# Patient Record
Sex: MALE | Race: WHITE | Employment: FULL TIME | URBAN - METROPOLITAN AREA
[De-identification: names, ages, dates, MRNs, and addresses within clinical notes are randomized per-mention and may not be internally consistent; named-entity substitution may affect disease eponyms.]

---

## 2017-06-16 PROBLEM — I25.118 CORONARY ARTERY DISEASE OF NATIVE ARTERY OF NATIVE HEART WITH STABLE ANGINA PECTORIS (HCC): Status: ACTIVE | Noted: 2017-06-16

## 2017-06-22 NOTE — PROGRESS NOTES
Patient pre-assessment complete for [facility] poss with [facility] scheduled for 17 at 12:30pm, arrival time 10:30am. Patient verified using . Patient instructed to bring all home medications in labeled bottles on the day of procedure. NPO status reinforced. Patient informed to take a full dose aspirin 325mg  or 81 mg x 4 on the day of procedure. Patient instructed to HOLD trulicity, invokana, glipizide in am. Instructed they can take all other medications excluding vitamins & supplements. Patient verbalizes understanding of all instructions & denies any questions at this time.

## 2017-06-23 ENCOUNTER — HOSPITAL ENCOUNTER (OUTPATIENT)
Dept: CARDIAC CATH/INVASIVE PROCEDURES | Age: 57
Discharge: HOME | End: 2017-06-23
Attending: INTERNAL MEDICINE | Admitting: INTERNAL MEDICINE
Payer: COMMERCIAL

## 2017-06-23 VITALS
BODY MASS INDEX: 33.13 KG/M2 | WEIGHT: 250 LBS | DIASTOLIC BLOOD PRESSURE: 73 MMHG | OXYGEN SATURATION: 95 % | HEIGHT: 73 IN | HEART RATE: 76 BPM | RESPIRATION RATE: 16 BRPM | SYSTOLIC BLOOD PRESSURE: 148 MMHG | TEMPERATURE: 98 F

## 2017-06-23 LAB
ANION GAP BLD CALC-SCNC: 9 MMOL/L (ref 7–16)
ATRIAL RATE: 74 BPM
BUN SERPL-MCNC: 18 MG/DL (ref 6–23)
CALCIUM SERPL-MCNC: 9 MG/DL (ref 8.3–10.4)
CALCULATED P AXIS, ECG09: 25 DEGREES
CALCULATED R AXIS, ECG10: 27 DEGREES
CALCULATED T AXIS, ECG11: 67 DEGREES
CHLORIDE SERPL-SCNC: 107 MMOL/L (ref 98–107)
CO2 SERPL-SCNC: 25 MMOL/L (ref 21–32)
CREAT SERPL-MCNC: 0.76 MG/DL (ref 0.8–1.5)
DIAGNOSIS, 93000: NORMAL
ERYTHROCYTE [DISTWIDTH] IN BLOOD BY AUTOMATED COUNT: 14.1 % (ref 11.9–14.6)
GLUCOSE SERPL-MCNC: 159 MG/DL (ref 65–100)
HCT VFR BLD AUTO: 49.4 % (ref 41.1–50.3)
HGB BLD-MCNC: 16.5 G/DL (ref 13.6–17.2)
INR PPP: 1 (ref 0.9–1.2)
MCH RBC QN AUTO: 27.9 PG (ref 26.1–32.9)
MCHC RBC AUTO-ENTMCNC: 33.4 G/DL (ref 31.4–35)
MCV RBC AUTO: 83.4 FL (ref 79.6–97.8)
P-R INTERVAL, ECG05: 178 MS
PLATELET # BLD AUTO: 165 K/UL (ref 150–450)
PMV BLD AUTO: 9.9 FL (ref 10.8–14.1)
POTASSIUM SERPL-SCNC: 4.4 MMOL/L (ref 3.5–5.1)
PROTHROMBIN TIME: 10.7 SEC (ref 9.6–12)
Q-T INTERVAL, ECG07: 374 MS
QRS DURATION, ECG06: 110 MS
QTC CALCULATION (BEZET), ECG08: 415 MS
RBC # BLD AUTO: 5.92 M/UL (ref 4.23–5.67)
SODIUM SERPL-SCNC: 141 MMOL/L (ref 136–145)
VENTRICULAR RATE, ECG03: 74 BPM
WBC # BLD AUTO: 5.3 K/UL (ref 4.3–11.1)

## 2017-06-23 PROCEDURE — 74011250637 HC RX REV CODE- 250/637: Performed by: INTERNAL MEDICINE

## 2017-06-23 PROCEDURE — 74011000250 HC RX REV CODE- 250: Performed by: INTERNAL MEDICINE

## 2017-06-23 PROCEDURE — 74011636320 HC RX REV CODE- 636/320: Performed by: INTERNAL MEDICINE

## 2017-06-23 PROCEDURE — 93458 L HRT ARTERY/VENTRICLE ANGIO: CPT

## 2017-06-23 PROCEDURE — 99152 MOD SED SAME PHYS/QHP 5/>YRS: CPT

## 2017-06-23 PROCEDURE — 93005 ELECTROCARDIOGRAM TRACING: CPT | Performed by: INTERNAL MEDICINE

## 2017-06-23 PROCEDURE — C1894 INTRO/SHEATH, NON-LASER: HCPCS

## 2017-06-23 PROCEDURE — 85610 PROTHROMBIN TIME: CPT | Performed by: INTERNAL MEDICINE

## 2017-06-23 PROCEDURE — C1760 CLOSURE DEV, VASC: HCPCS

## 2017-06-23 PROCEDURE — 99153 MOD SED SAME PHYS/QHP EA: CPT

## 2017-06-23 PROCEDURE — 85027 COMPLETE CBC AUTOMATED: CPT | Performed by: INTERNAL MEDICINE

## 2017-06-23 PROCEDURE — 74011250636 HC RX REV CODE- 250/636

## 2017-06-23 PROCEDURE — 77030004558 HC CATH ANGI DX SUPR TORQ CARD -A

## 2017-06-23 PROCEDURE — C1769 GUIDE WIRE: HCPCS

## 2017-06-23 PROCEDURE — 74011250636 HC RX REV CODE- 250/636: Performed by: INTERNAL MEDICINE

## 2017-06-23 PROCEDURE — 77030004559 HC CATH ANGI DX SUPT CARD -B

## 2017-06-23 PROCEDURE — 80048 BASIC METABOLIC PNL TOTAL CA: CPT | Performed by: INTERNAL MEDICINE

## 2017-06-23 RX ORDER — MIDAZOLAM HYDROCHLORIDE 1 MG/ML
.5-2 INJECTION, SOLUTION INTRAMUSCULAR; INTRAVENOUS
Status: DISCONTINUED | OUTPATIENT
Start: 2017-06-23 | End: 2017-06-23

## 2017-06-23 RX ORDER — SODIUM CHLORIDE 9 MG/ML
75 INJECTION, SOLUTION INTRAVENOUS CONTINUOUS
Status: DISCONTINUED | OUTPATIENT
Start: 2017-06-23 | End: 2017-06-23 | Stop reason: HOSPADM

## 2017-06-23 RX ORDER — CLOPIDOGREL BISULFATE 75 MG/1
75 TABLET ORAL DAILY
Qty: 30 TAB | Refills: 11 | Status: SHIPPED | OUTPATIENT
Start: 2017-06-23 | End: 2017-08-03 | Stop reason: ALTCHOICE

## 2017-06-23 RX ORDER — HEPARIN SODIUM 200 [USP'U]/100ML
3 INJECTION, SOLUTION INTRAVENOUS CONTINUOUS
Status: DISCONTINUED | OUTPATIENT
Start: 2017-06-23 | End: 2017-06-23

## 2017-06-23 RX ORDER — MORPHINE SULFATE 2 MG/ML
2 INJECTION, SOLUTION INTRAMUSCULAR; INTRAVENOUS ONCE
Status: COMPLETED | OUTPATIENT
Start: 2017-06-23 | End: 2017-06-23

## 2017-06-23 RX ORDER — DIAZEPAM 5 MG/1
5 TABLET ORAL ONCE
Status: COMPLETED | OUTPATIENT
Start: 2017-06-23 | End: 2017-06-23

## 2017-06-23 RX ORDER — GUAIFENESIN 100 MG/5ML
81-324 LIQUID (ML) ORAL ONCE
Status: DISCONTINUED | OUTPATIENT
Start: 2017-06-23 | End: 2017-06-23 | Stop reason: HOSPADM

## 2017-06-23 RX ORDER — LIDOCAINE HYDROCHLORIDE 20 MG/ML
60-200 INJECTION, SOLUTION INFILTRATION; PERINEURAL
Status: DISCONTINUED | OUTPATIENT
Start: 2017-06-23 | End: 2017-06-23

## 2017-06-23 RX ADMIN — MORPHINE SULFATE 2 MG: 2 INJECTION, SOLUTION INTRAMUSCULAR; INTRAVENOUS at 15:40

## 2017-06-23 RX ADMIN — LIDOCAINE HYDROCHLORIDE 200 MG: 20 INJECTION, SOLUTION INFILTRATION; PERINEURAL at 12:40

## 2017-06-23 RX ADMIN — DIAZEPAM 5 MG: 5 TABLET ORAL at 10:59

## 2017-06-23 RX ADMIN — SODIUM CHLORIDE 75 ML/HR: 900 INJECTION, SOLUTION INTRAVENOUS at 10:56

## 2017-06-23 RX ADMIN — IOPAMIDOL 225 ML: 755 INJECTION, SOLUTION INTRAVENOUS at 13:25

## 2017-06-23 RX ADMIN — MIDAZOLAM HYDROCHLORIDE 2 MG: 1 INJECTION, SOLUTION INTRAMUSCULAR; INTRAVENOUS at 12:54

## 2017-06-23 RX ADMIN — MIDAZOLAM HYDROCHLORIDE 2 MG: 1 INJECTION, SOLUTION INTRAMUSCULAR; INTRAVENOUS at 12:39

## 2017-06-23 RX ADMIN — HEPARIN SODIUM 3 ML/HR: 200 INJECTION, SOLUTION INTRAVENOUS at 12:40

## 2017-06-23 RX ADMIN — LIDOCAINE HYDROCHLORIDE 200 MG: 20 INJECTION, SOLUTION INFILTRATION; PERINEURAL at 12:45

## 2017-06-23 RX ADMIN — MIDAZOLAM HYDROCHLORIDE 2 MG: 1 INJECTION, SOLUTION INTRAMUSCULAR; INTRAVENOUS at 12:45

## 2017-06-23 NOTE — PROCEDURES
Brief Cardiac Procedure Note    Patient: Marcelo Wallace MRN: [id number]  SSN: [id number]    YOB: 1960  Age: 64 y.o. Sex: male      Date of Procedure: 6/23/2017     Pre-procedure Diagnosis: Coronary Artery Disease    Post-procedure Diagnosis: Coronary Artery Disease    Procedure: Left Heart Catheterization    Brief Description of Procedure: via rfa and lfa    Performed By: Mouna Martínez MD     Assistants:     Anesthesia: Moderate Sedation    Estimated Blood Loss: Less than 10 mL      Specimens: None    Implants: None    Findings:   lv ok  Lm ok  Lad 100%, fills late  lcx ok  rca ok  + Mynx x 2    Complications: None    Recommendations: pci 7/19.     Signed By: Mouna Martínez MD     June 23, 2017

## 2017-06-23 NOTE — PROGRESS NOTES
TRANSFER - OUT REPORT:    Verbal report given to Kat Gibbons RN (name) on Dilia Barnes  being transferred to CPRU (unit) for routine progression of care       Report consisted of patients Situation, Background, Assessment and   Recommendations(SBAR). Information from the following report(s) SBAR was reviewed with the receiving nurse. Lines:   Peripheral IV 06/23/17 Right Antecubital (Active)       Peripheral IV 06/23/17 Left Forearm (Active)        Opportunity for questions and clarification was provided. Patient transported with:   Tech         Pt had LHC via L and R groin. Both sites closed with Mynx and tegaderm. Pt received Versed 6mg IV.

## 2017-06-23 NOTE — PROGRESS NOTES
Patient received to [address] [place] [address] room # 4  Ambulatory from [facility]. Patient scheduled for [facility] today with Dr Luz Smith. Procedure reviewed & questions answered, voiced good understanding consent obtained & placed on chart. All medications and medical history reviewed. Will prep patient per orders. Patient & family updated on plan of care.

## 2017-06-23 NOTE — IP AVS SNAPSHOT
[facility] 
 
 
 [address] [facility] [address] [phone] Patient: Shelby Hodges MRN: [id number] THU:4/9/3677 Discharge Summary 6/23/2017 Shelby COXN[de-identified]  [id number] Admission Information Provider Pager Service Admission Date Expected D/C Date Je Jimenez MD  CARDIAC CATH LAB 6/23/2017 6/23/2017 Actual LOS Patient Class 0 days OUTPATIENT Follow-up Information Follow up With Details Comments Contact Info Meaghan Castillo MD   [id number] [place] [address] 
[phone] Procedure scheduled for Wednesday July 19th at 8am, please arrive to [facility] at [place]   
  
  
Current Discharge Medication List  
  
START taking these medications Dose & Instructions Dispensing Information Comments Morning Noon Evening Bedtime  
 clopidogrel 75 mg Tab Commonly known as:  PLAVIX Your last dose was: Your next dose is:    
   
   
 Dose:  75 mg Take 1 Tab by mouth daily. Quantity:  30 Tab Refills:  11 CONTINUE these medications which have CHANGED Dose & Instructions Dispensing Information Comments Morning Noon Evening Bedtime  
 clotrimazole-betamethasone topical cream  
Commonly known as:  Tabatha Franci What changed:   
- when to take this 
- reasons to take this 
- additional instructions Your last dose was: Your next dose is:    
   
   
 1 application 2 times daily Quantity:  30 g Refills:  4  
     
   
   
   
  
 omega-3 acid ethyl esters 1 gram capsule Commonly known as:  Marcelo Tristan What changed:  when to take this Your last dose was: Your next dose is:    
   
   
 Dose:  4 Cap Take 4 Caps by mouth four (4) times daily. Quantity:  360 Cap Refills:  3 CONTINUE these medications which have NOT CHANGED Dose & Instructions Dispensing Information Comments Morning Noon Evening Bedtime  
 amLODIPine 10 mg tablet Commonly known as:  Wing Ramos Your last dose was: Your next dose is:    
   
   
 Dose:  10 mg Take 1 Tab by mouth daily. Indications: Hypertension Quantity:  90 Tab Refills:  3  
     
   
   
   
  
 aspirin delayed-release 325 mg tablet Your last dose was: Your next dose is:    
   
   
 Dose:  325 mg Take 325 mg by mouth daily. Refills:  0  
     
   
   
   
  
 benazepril 40 mg tablet Commonly known as:  LOTENSIN Your last dose was: Your next dose is:    
   
   
 Dose:  40 mg Take 1 Tab by mouth daily. Indications: Hypertension Quantity:  90 Tab Refills:  3  
     
   
   
   
  
 canagliflozin 300 mg tablet Commonly known as:  Ruiz Beverage Your last dose was: Your next dose is:    
   
   
 Dose:  300 mg Take 1 Tab by mouth daily. Quantity:  90 Tab Refills:  3  
     
   
   
   
  
 * VITAMIN B-12 1,000 mcg tablet Generic drug:  cyanocobalamin Your last dose was: Your next dose is:    
   
   
 Dose:  1000 mcg Take 1,000 mcg by mouth daily. Refills:  0  
     
   
   
   
  
 * cyanocobalamin (vitamin B-12) 1,000 mcg/mL Kit Your last dose was: Your next dose is:    
   
   
 Dose:  1000 mcg  
1,000 mcg by Injection route every month. Quantity:  3 Vial  
Refills:  3  
     
   
   
   
  
 dicyclomine 10 mg capsule Commonly known as:  BENTYL Your last dose was: Your next dose is:    
   
   
 Dose:  10 mg  
take 10 mg by mouth four (4) times daily. Refills:  0  
     
   
   
   
  
 dulaglutide 1.5 mg/0.5 mL sub-q pen Commonly known as:  TRULICITY Your last dose was: Your next dose is:    
   
   
 Dose:  1.5 mg  
0.5 mL by SubCUTAneous route every seven (7) days. Quantity:  12 Pen Refills:  3 fenofibrate 160 mg tablet Commonly known as:  LOFIBRA Your last dose was: Your next dose is:    
   
   
 Dose:  160 mg Take 1 Tab by mouth daily. Quantity:  90 Tab Refills:  3  
     
   
   
   
  
 folic acid 1 mg tablet Commonly known as:  Google Your last dose was: Your next dose is:    
   
   
 Dose:  1 mg Take 1 Tab by mouth daily. Quantity:  90 Tab Refills:  3  
     
   
   
   
  
 glipiZIDE 10 mg tablet Commonly known as:  Shreya Bruce Your last dose was: Your next dose is:    
   
   
 Dose:  10 mg Take 1 Tab by mouth two (2) times a day. Quantity:  180 Tab Refills:  3 HYDROcodone-acetaminophen 7.5-325 mg per tablet Commonly known as:  Rebecca Corral Your last dose was: Your next dose is:    
   
   
 Dose:  1 Tab Take 1 Tab by mouth four (4) times daily as needed. Refills:  0  
     
   
   
   
  
 isosorbide mononitrate 10 mg tablet Commonly known as:  ISMO, MONOKET Your last dose was: Your next dose is:    
   
   
 Dose:  10 mg Take 1 Tab by mouth two (2) times a day. Quantity:  180 Tab Refills:  3  
     
   
   
   
  
 lansoprazole 15 mg capsule Commonly known as:  PREVACID Your last dose was: Your next dose is: Take  by mouth Daily (before breakfast). Refills:  0  
     
   
   
   
  
 metFORMIN 1,000 mg tablet Commonly known as:  GLUCOPHAGE Your last dose was: Your next dose is:    
   
   
 Dose:  1000 mg Take 1 Tab by mouth two (2) times daily (with meals). Quantity:  180 Tab Refills:  3 Use generic   
    
   
   
   
  
 metoprolol tartrate 100 mg IR tablet Commonly known as:  LOPRESSOR Your last dose was: Your next dose is:    
   
   
 Dose:  100 mg Take 1 Tab by mouth two (2) times a day. Quantity:  180 Tab Refills:  3  
     
   
   
   
  
 multivitamin tablet Commonly known as:  ONE A DAY Your last dose was: Your next dose is:    
   
   
 Dose:  1 Tab Take 1 Tab by mouth daily. Refills:  0  
     
   
   
   
  
 nitroglycerin 0.4 mg SL tablet Commonly known as:  NITROSTAT Your last dose was: Your next dose is:    
   
   
 Dose:  0.4 mg  
1 Tab by SubLINGual route every five (5) minutes as needed for Chest Pain. Quantity:  3 Bottle Refills:  3  
     
   
   
   
  
 ondansetron hcl 4 mg tablet Commonly known as:  Jacquie Crowley Your last dose was: Your next dose is:    
   
   
 Dose:  4 mg Take 4 mg by mouth. 1 tablet every 4-6 hours prn nausea and vomiting Refills:  0  
     
   
   
   
  
 potassium chloride 10 mEq tablet Commonly known as:  KLOR-CON Your last dose was: Your next dose is:    
   
   
 Dose:  10 mEq Take 1 Tab by mouth daily. Quantity:  90 Tab Refills:  3  
     
   
   
   
  
 pravastatin 40 mg tablet Commonly known as:  PRAVACHOL Your last dose was: Your next dose is:    
   
   
 Dose:  40 mg Take 1 Tab by mouth daily. Quantity:  90 Tab Refills:  3  
     
   
   
   
  
 ranolazine  mg SR tablet Commonly known as:  RANEXA Your last dose was: Your next dose is:    
   
   
 Dose:  500 mg Take 1 Tab by mouth two (2) times a day. Quantity:  180 Tab Refills:  3  
     
   
   
   
  
 traMADol 50 mg tablet Commonly known as:  ULTRAM  
   
Your last dose was: Your next dose is:    
   
   
 Dose:  50 mg Take 50 mg by mouth every six (6) hours as needed for Pain. Refills:  0  
     
   
   
   
  
 * Notice: This list has 2 medication(s) that are the same as other medications prescribed for you. Read the directions carefully, and ask your doctor or other care provider to review them with you. Where to Get Your Medications These medications were sent to [address], [address] [address] Phone:  [phone]  
  clopidogrel 75 mg Tab General Information Please provide this summary of care documentation to your next provider. Allergies Unspecified:  Meloxicam  
  
Current Immunizations  Never Reviewed Name Date Influenza Vaccine 9/11/2015 Influenza Vaccine (Quad) PF 9/28/2016 Pneumococcal Vaccine (Unspecified Type) 10/25/2013 Discharge Instructions Discharge Instructions HEART CATHETERIZATION/ANGIOGRAPHY DISCHARGE INSTRUCTIONS 1. Check puncture site frequently for swelling or bleeding. If there is any bleeding, lie down and apply pressure over the area with a clean towel or washcloth and call 911. Notify your doctor for any redness, swelling, drainage, or oozing from the puncture site. Notify your doctor for any fever or chills. 2. If the extremity becomes cold, numb, or painful call [facility] Cardiology at [phone]. 
3. Activity should be limited for the next 48 hours. Climb stairs as little as possible and avoid any stooping, bending, or strenuous activity for 48 hours. No heavy lifting (anything over 10 pounds) for 3 days. 4. You may resume your usual diet. Drink more fluids than usual. 
5. Have a responsible person drive you home and stay with you for at least 24 hours after your heart catheterization/angiography. 6. You may remove bandage from your Right, left groin in 24 hours. You may shower in 24 hours. No tub baths, hot tubs, or swimming for 1 week. Do not place any lotions, creams, powders, or ointments over puncture site for 1 week. You may place a clean band-aid over the puncture site each day for 5 days. Change daily. Procedure scheduled for Wednesday July 19th at 8am, please arrive to [address] [address] at [address] ([address]) UNTIL Sunday AFTERNOON. I have read the above instructions and have had the opportunity to ask questions. Patient: ________________________   Date: 6/23/2017 Witness: _______________________   Date: 6/23/2017 Discharge Orders None  
  
` Patient Signature:  ____________________________________________________________ Date:  ____________________________________________________________  
  
 Jayme Mario Alberto Provider Signature:  ____________________________________________________________ Date:  ____________________________________________________________

## 2017-06-23 NOTE — PROGRESS NOTES
Patient up to bedside, vital signs and site stable. Patient ambulated to bathroom without difficulty. Patient voided without difficulty. Vascular site stable. 1647 Discharge instructions and home medications reviewed with patient. Time allowed for questions and answers. 1653 Patient ambulated second time without difficulty. Site stable after ambulation. Peripheral IV sites dc'd without difficulty with tips intact. 1700 Patient discharged to home with family.

## 2017-06-23 NOTE — DISCHARGE INSTRUCTIONS
HEART CATHETERIZATION/ANGIOGRAPHY DISCHARGE INSTRUCTIONS    1. Check puncture site frequently for swelling or bleeding. If there is any bleeding, lie down and apply pressure over the area with a clean towel or washcloth and call 911. Notify your doctor for any redness, swelling, drainage, or oozing from the puncture site. Notify your doctor for any fever or chills. 2. If the extremity becomes cold, numb, or painful call [address] [address] Cardiology at [phone].  3. Activity should be limited for the next 48 hours. Climb stairs as little as possible and avoid any stooping, bending, or strenuous activity for 48 hours. No heavy lifting (anything over 10 pounds) for 3 days. 4. You may resume your usual diet. Drink more fluids than usual.  5. Have a responsible person drive you home and stay with you for at least 24 hours after your heart catheterization/angiography. 6. You may remove bandage from your Right, left groin in 24 hours. You may shower in 24 hours. No tub baths, hot tubs, or swimming for 1 week. Do not place any lotions, creams, powders, or ointments over puncture site for 1 week. You may place a clean band-aid over the puncture site each day for 5 days. Change daily. Procedure scheduled for Wednesday July 19th at 8am, [facility] ([address] UNTIL Sunday AFTERNOON. I have read the above instructions and have had the opportunity to ask questions.       Patient: ________________________   Date: 6/23/2017    Witness: _______________________   Date: 6/23/2017

## 2017-06-23 NOTE — PROCEDURES
[facility] [address]       Name:  Elias Pritchett   MR#:  [id number]   :  1960   Account #:  [de-identified]   Date of Adm:  2017       PROCEDURE PERFORMED: Left heart catheterization, left   ventriculography, and coronary angiography. HISTORY: This is a 63-year-old gentleman with coronary artery   disease. He has a known chronic occlusion of his left anterior   descending coronary artery. He had chronic angina that has been   somewhat progressive. A nuclear stress test shows anterior   ischemia. A cardiac catheterization in anticipation of possible    angioplasty is recommended. PROCEDURE: Left heart catheterization, left ventriculography,   coronary angiography, and simultaneous left and right coronary   angiography were performed via the right and left common femoral   arteries without difficulty with a 6-Bolivian JL4, JR4, and angled   pigtail. At the end of the procedure, the sheaths were removed   and Mynx closure devices were used with good hemostasis. FINDINGS: The central aortic pressure is 120/70 mmHg. The left   ventricular end-diastolic pressure is 10 mmHg. There is no   gradient on pullback across the aortic valve. The overall left ventricular size is normal. The wall motion is   normal. The ejection fraction is 60%. The left main is normal. It divides into an LAD and left   circumflex. The LAD is calcified in its proximal segment. It is occluded   after the first septal . The distal vessel fills by   collateral flow from the right. There is a large major   bifurcated diagonal branch off the LAD, which is chronically   occluded. It fills by antegrade flow. The left circumflex is a big vessel. There is mild proximal   disease. There is a stent in the mid portion with no restenosis. The distal vessel has mild obstruction at a bifurcation. The right coronary artery is a big vessel.  It has diffuse   atherosclerotic irregularity with no significant obstruction. It   provides distal collateral filling to the LAD circulation. IMPRESSION   1. Normal left ventricular function. 2. Coronary artery disease, as described. The proximal left   anterior descending is occluded and fills well by collateral   flow from the right. There is a large major diagonal branch   which is chronically occluded at its origin. The left circumflex   has been stented in its mid portion with no restenosis. The   right coronary artery has mild disease.     RECOMMENDATIONS: The patient will be considered for  PCI of   the LAD and LAD diagonal.        Arelia Skiff, MD [facility] / ALICIA   D:  06/23/2017   13:37   T:  06/23/2017   13:57   Job #:  [id number]

## 2017-06-23 NOTE — PROGRESS NOTES
Report received from Jerry, [address] [place] [address]. Procedural findings communicated. Intra procedural  medication administration reviewed. Progression of care discussed.      Patient received into [address] [facility] 7 post sheath removal.     Access site without bleeding or swelling yes    Dressing dry and intact yes    Patient instructed to limit movement to bilateral lower extremity    Routine post procedural vital signs and site assessment initiated yes

## 2017-07-17 NOTE — PROGRESS NOTES
Patient pre-assessment complete for [facility] scheduled for , arrival time 0600 am. Patient verified using . Patient instructed to bring all home medications in labeled bottles on the day of procedure. NPO status reinforced. Patient informed to take a full dose aspirin 325mg  or 81 mg x 4 on the day of procedure. Patient instructed to HOLD Benazepril, Invokana, Metformin (starting tomorrow), and  Glucotrol morning of procedure. Instructed they can take all other medications excluding vitamins & supplements. Patient verbalizes understanding of all instructions & denies any questions at this time.

## 2017-07-19 ENCOUNTER — HOSPITAL ENCOUNTER (OUTPATIENT)
Dept: CARDIAC CATH/INVASIVE PROCEDURES | Age: 57
Setting detail: OBSERVATION
Discharge: HOME | End: 2017-07-20
Attending: INTERNAL MEDICINE | Admitting: INTERNAL MEDICINE
Payer: COMMERCIAL

## 2017-07-19 LAB
ACT BLD: 219 SECS (ref 70–128)
ACT BLD: 257 SECS (ref 70–128)
ACT BLD: 268 SECS (ref 70–128)
ACT BLD: 285 SECS (ref 70–128)
ACT BLD: 290 SECS (ref 70–128)
ACT BLD: 301 SECS (ref 70–128)
ACT BLD: 307 SECS (ref 70–128)
ANION GAP BLD CALC-SCNC: 10 MMOL/L (ref 7–16)
ATRIAL RATE: 75 BPM
BUN SERPL-MCNC: 17 MG/DL (ref 6–23)
CALCIUM SERPL-MCNC: 9.3 MG/DL (ref 8.3–10.4)
CALCULATED P AXIS, ECG09: 29 DEGREES
CALCULATED R AXIS, ECG10: 24 DEGREES
CALCULATED T AXIS, ECG11: 59 DEGREES
CHLORIDE SERPL-SCNC: 109 MMOL/L (ref 98–107)
CO2 SERPL-SCNC: 22 MMOL/L (ref 21–32)
CREAT SERPL-MCNC: 0.75 MG/DL (ref 0.8–1.5)
DIAGNOSIS, 93000: NORMAL
ERYTHROCYTE [DISTWIDTH] IN BLOOD BY AUTOMATED COUNT: 14.3 % (ref 11.9–14.6)
GLUCOSE BLD STRIP.AUTO-MCNC: 166 MG/DL (ref 65–100)
GLUCOSE SERPL-MCNC: 212 MG/DL (ref 65–100)
HCT VFR BLD AUTO: 47.6 % (ref 41.1–50.3)
HGB BLD-MCNC: 15.4 G/DL (ref 13.6–17.2)
INR PPP: 1 (ref 0.9–1.2)
MCH RBC QN AUTO: 27.5 PG (ref 26.1–32.9)
MCHC RBC AUTO-ENTMCNC: 32.4 G/DL (ref 31.4–35)
MCV RBC AUTO: 85 FL (ref 79.6–97.8)
P-R INTERVAL, ECG05: 184 MS
PLATELET # BLD AUTO: 157 K/UL (ref 150–450)
PMV BLD AUTO: 10.4 FL (ref 10.8–14.1)
POTASSIUM SERPL-SCNC: 4.3 MMOL/L (ref 3.5–5.1)
PROTHROMBIN TIME: 10.4 SEC (ref 9.6–12)
Q-T INTERVAL, ECG07: 386 MS
QRS DURATION, ECG06: 100 MS
QTC CALCULATION (BEZET), ECG08: 431 MS
RBC # BLD AUTO: 5.6 M/UL (ref 4.23–5.67)
SODIUM SERPL-SCNC: 141 MMOL/L (ref 136–145)
VENTRICULAR RATE, ECG03: 75 BPM
WBC # BLD AUTO: 3.9 K/UL (ref 4.3–11.1)

## 2017-07-19 PROCEDURE — C1769 GUIDE WIRE: HCPCS

## 2017-07-19 PROCEDURE — 99218 HC RM OBSERVATION: CPT

## 2017-07-19 PROCEDURE — C1887 CATHETER, GUIDING: HCPCS

## 2017-07-19 PROCEDURE — 85347 COAGULATION TIME ACTIVATED: CPT

## 2017-07-19 PROCEDURE — C1725 CATH, TRANSLUMIN NON-LASER: HCPCS

## 2017-07-19 PROCEDURE — 92943 PRQ TRLUML REVSC CH OCC ANT: CPT

## 2017-07-19 PROCEDURE — C1894 INTRO/SHEATH, NON-LASER: HCPCS

## 2017-07-19 PROCEDURE — 82962 GLUCOSE BLOOD TEST: CPT

## 2017-07-19 PROCEDURE — 74011636320 HC RX REV CODE- 636/320: Performed by: INTERNAL MEDICINE

## 2017-07-19 PROCEDURE — 85610 PROTHROMBIN TIME: CPT | Performed by: INTERNAL MEDICINE

## 2017-07-19 PROCEDURE — 99153 MOD SED SAME PHYS/QHP EA: CPT

## 2017-07-19 PROCEDURE — 80048 BASIC METABOLIC PNL TOTAL CA: CPT | Performed by: INTERNAL MEDICINE

## 2017-07-19 PROCEDURE — C1874 STENT, COATED/COV W/DEL SYS: HCPCS

## 2017-07-19 PROCEDURE — 74011250636 HC RX REV CODE- 250/636

## 2017-07-19 PROCEDURE — 99152 MOD SED SAME PHYS/QHP 5/>YRS: CPT

## 2017-07-19 PROCEDURE — 74011250637 HC RX REV CODE- 250/637: Performed by: INTERNAL MEDICINE

## 2017-07-19 PROCEDURE — 74011250636 HC RX REV CODE- 250/636: Performed by: INTERNAL MEDICINE

## 2017-07-19 PROCEDURE — 85027 COMPLETE CBC AUTOMATED: CPT | Performed by: INTERNAL MEDICINE

## 2017-07-19 PROCEDURE — 74011000250 HC RX REV CODE- 250: Performed by: INTERNAL MEDICINE

## 2017-07-19 PROCEDURE — C1760 CLOSURE DEV, VASC: HCPCS

## 2017-07-19 PROCEDURE — 93005 ELECTROCARDIOGRAM TRACING: CPT | Performed by: INTERNAL MEDICINE

## 2017-07-19 PROCEDURE — 77030012468 HC VLV BLEEDBK CNTRL ABBT -B

## 2017-07-19 RX ORDER — HYDROMORPHONE HYDROCHLORIDE 1 MG/ML
1 INJECTION, SOLUTION INTRAMUSCULAR; INTRAVENOUS; SUBCUTANEOUS
Status: COMPLETED | OUTPATIENT
Start: 2017-07-19 | End: 2017-07-19

## 2017-07-19 RX ORDER — PRAVASTATIN SODIUM 20 MG/1
40 TABLET ORAL DAILY
Status: DISCONTINUED | OUTPATIENT
Start: 2017-07-20 | End: 2017-07-20 | Stop reason: HOSPADM

## 2017-07-19 RX ORDER — MIDAZOLAM HYDROCHLORIDE 1 MG/ML
.5-5 INJECTION, SOLUTION INTRAMUSCULAR; INTRAVENOUS
Status: DISCONTINUED | OUTPATIENT
Start: 2017-07-19 | End: 2017-07-19 | Stop reason: HOSPADM

## 2017-07-19 RX ORDER — HEPARIN SODIUM 200 [USP'U]/100ML
3 INJECTION, SOLUTION INTRAVENOUS CONTINUOUS
Status: DISCONTINUED | OUTPATIENT
Start: 2017-07-19 | End: 2017-07-19 | Stop reason: HOSPADM

## 2017-07-19 RX ORDER — GUAIFENESIN 100 MG/5ML
81-324 LIQUID (ML) ORAL ONCE
Status: DISCONTINUED | OUTPATIENT
Start: 2017-07-19 | End: 2017-07-19

## 2017-07-19 RX ORDER — FENOFIBRATE 160 MG/1
160 TABLET ORAL DAILY
Status: DISCONTINUED | OUTPATIENT
Start: 2017-07-20 | End: 2017-07-20 | Stop reason: HOSPADM

## 2017-07-19 RX ORDER — SODIUM CHLORIDE 9 MG/ML
75 INJECTION, SOLUTION INTRAVENOUS CONTINUOUS
Status: DISCONTINUED | OUTPATIENT
Start: 2017-07-19 | End: 2017-07-20 | Stop reason: HOSPADM

## 2017-07-19 RX ORDER — HEPARIN SODIUM 10000 [USP'U]/ML
40-80 INJECTION, SOLUTION INTRAVENOUS; SUBCUTANEOUS
Status: DISCONTINUED | OUTPATIENT
Start: 2017-07-19 | End: 2017-07-19 | Stop reason: HOSPADM

## 2017-07-19 RX ORDER — RANOLAZINE 500 MG/1
500 TABLET, EXTENDED RELEASE ORAL EVERY 12 HOURS
Status: DISCONTINUED | OUTPATIENT
Start: 2017-07-19 | End: 2017-07-20 | Stop reason: HOSPADM

## 2017-07-19 RX ORDER — TRAMADOL HYDROCHLORIDE 50 MG/1
50 TABLET ORAL
Status: DISCONTINUED | OUTPATIENT
Start: 2017-07-19 | End: 2017-07-20 | Stop reason: HOSPADM

## 2017-07-19 RX ORDER — AMLODIPINE BESYLATE 10 MG/1
10 TABLET ORAL DAILY
Status: DISCONTINUED | OUTPATIENT
Start: 2017-07-20 | End: 2017-07-20 | Stop reason: HOSPADM

## 2017-07-19 RX ORDER — METOPROLOL TARTRATE 50 MG/1
50 TABLET ORAL 4 TIMES DAILY
Status: DISCONTINUED | OUTPATIENT
Start: 2017-07-19 | End: 2017-07-20 | Stop reason: HOSPADM

## 2017-07-19 RX ORDER — ONDANSETRON 4 MG/1
4 TABLET, ORALLY DISINTEGRATING ORAL
Status: DISCONTINUED | OUTPATIENT
Start: 2017-07-19 | End: 2017-07-20 | Stop reason: HOSPADM

## 2017-07-19 RX ORDER — BENAZEPRIL HYDROCHLORIDE 10 MG/1
40 TABLET ORAL DAILY
Status: DISCONTINUED | OUTPATIENT
Start: 2017-07-20 | End: 2017-07-20 | Stop reason: HOSPADM

## 2017-07-19 RX ORDER — CLOPIDOGREL BISULFATE 75 MG/1
75 TABLET ORAL DAILY
Status: DISCONTINUED | OUTPATIENT
Start: 2017-07-20 | End: 2017-07-20 | Stop reason: HOSPADM

## 2017-07-19 RX ORDER — HYDROCODONE BITARTRATE AND ACETAMINOPHEN 7.5; 325 MG/1; MG/1
1 TABLET ORAL
Status: DISCONTINUED | OUTPATIENT
Start: 2017-07-19 | End: 2017-07-20 | Stop reason: HOSPADM

## 2017-07-19 RX ORDER — GUAIFENESIN 100 MG/5ML
81 LIQUID (ML) ORAL DAILY
Status: DISCONTINUED | OUTPATIENT
Start: 2017-07-20 | End: 2017-07-20 | Stop reason: HOSPADM

## 2017-07-19 RX ORDER — CLOPIDOGREL 300 MG/1
300 TABLET, FILM COATED ORAL ONCE
Status: COMPLETED | OUTPATIENT
Start: 2017-07-19 | End: 2017-07-19

## 2017-07-19 RX ORDER — FENTANYL CITRATE 50 UG/ML
25-100 INJECTION, SOLUTION INTRAMUSCULAR; INTRAVENOUS
Status: DISCONTINUED | OUTPATIENT
Start: 2017-07-19 | End: 2017-07-19 | Stop reason: HOSPADM

## 2017-07-19 RX ORDER — LIDOCAINE HYDROCHLORIDE 20 MG/ML
1-20 INJECTION, SOLUTION INFILTRATION; PERINEURAL
Status: DISCONTINUED | OUTPATIENT
Start: 2017-07-19 | End: 2017-07-19 | Stop reason: HOSPADM

## 2017-07-19 RX ORDER — DIAZEPAM 5 MG/1
5 TABLET ORAL ONCE
Status: COMPLETED | OUTPATIENT
Start: 2017-07-19 | End: 2017-07-19

## 2017-07-19 RX ORDER — GLIPIZIDE 5 MG/1
10 TABLET ORAL 2 TIMES DAILY
Status: DISCONTINUED | OUTPATIENT
Start: 2017-07-19 | End: 2017-07-20 | Stop reason: HOSPADM

## 2017-07-19 RX ADMIN — MIDAZOLAM HYDROCHLORIDE 2 MG: 1 INJECTION, SOLUTION INTRAMUSCULAR; INTRAVENOUS at 11:32

## 2017-07-19 RX ADMIN — IOPAMIDOL 420 ML: 755 INJECTION, SOLUTION INTRAVENOUS at 13:23

## 2017-07-19 RX ADMIN — MIDAZOLAM HYDROCHLORIDE 2 MG: 1 INJECTION, SOLUTION INTRAMUSCULAR; INTRAVENOUS at 08:53

## 2017-07-19 RX ADMIN — GLIPIZIDE 10 MG: 5 TABLET ORAL at 17:19

## 2017-07-19 RX ADMIN — MIDAZOLAM HYDROCHLORIDE 2 MG: 1 INJECTION, SOLUTION INTRAMUSCULAR; INTRAVENOUS at 10:30

## 2017-07-19 RX ADMIN — CLOPIDOGREL BISULFATE 300 MG: 300 TABLET, FILM COATED ORAL at 13:34

## 2017-07-19 RX ADMIN — HEPARIN SODIUM 3 ML/HR: 200 INJECTION, SOLUTION INTRAVENOUS at 08:58

## 2017-07-19 RX ADMIN — HEPARIN SODIUM 2000 UNITS: 10000 INJECTION, SOLUTION INTRAVENOUS; SUBCUTANEOUS at 10:13

## 2017-07-19 RX ADMIN — HYDROCODONE BITARTRATE AND ACETAMINOPHEN 1 TABLET: 7.5; 325 TABLET ORAL at 16:48

## 2017-07-19 RX ADMIN — MIDAZOLAM HYDROCHLORIDE 2 MG: 1 INJECTION, SOLUTION INTRAMUSCULAR; INTRAVENOUS at 09:50

## 2017-07-19 RX ADMIN — LIDOCAINE HYDROCHLORIDE 200 MG: 20 INJECTION, SOLUTION INFILTRATION; PERINEURAL at 08:58

## 2017-07-19 RX ADMIN — METOPROLOL TARTRATE 50 MG: 50 TABLET ORAL at 22:58

## 2017-07-19 RX ADMIN — MIDAZOLAM HYDROCHLORIDE 2 MG: 1 INJECTION, SOLUTION INTRAMUSCULAR; INTRAVENOUS at 10:13

## 2017-07-19 RX ADMIN — DIAZEPAM 5 MG: 5 TABLET ORAL at 07:23

## 2017-07-19 RX ADMIN — FENTANYL CITRATE 50 MCG: 50 INJECTION, SOLUTION INTRAMUSCULAR; INTRAVENOUS at 12:11

## 2017-07-19 RX ADMIN — FENTANYL CITRATE 50 MCG: 50 INJECTION, SOLUTION INTRAMUSCULAR; INTRAVENOUS at 10:16

## 2017-07-19 RX ADMIN — MIDAZOLAM HYDROCHLORIDE 2 MG: 1 INJECTION, SOLUTION INTRAMUSCULAR; INTRAVENOUS at 12:01

## 2017-07-19 RX ADMIN — SODIUM CHLORIDE 75 ML/HR: 900 INJECTION, SOLUTION INTRAVENOUS at 06:58

## 2017-07-19 RX ADMIN — MIDAZOLAM HYDROCHLORIDE 2 MG: 1 INJECTION, SOLUTION INTRAMUSCULAR; INTRAVENOUS at 12:54

## 2017-07-19 RX ADMIN — HEPARIN SODIUM 2500 UNITS: 10000 INJECTION, SOLUTION INTRAVENOUS; SUBCUTANEOUS at 11:57

## 2017-07-19 RX ADMIN — FENTANYL CITRATE 50 MCG: 50 INJECTION, SOLUTION INTRAMUSCULAR; INTRAVENOUS at 11:46

## 2017-07-19 RX ADMIN — FENTANYL CITRATE 50 MCG: 50 INJECTION, SOLUTION INTRAMUSCULAR; INTRAVENOUS at 10:54

## 2017-07-19 RX ADMIN — HYDROMORPHONE HYDROCHLORIDE 1 MG: 1 INJECTION, SOLUTION INTRAMUSCULAR; INTRAVENOUS; SUBCUTANEOUS at 08:55

## 2017-07-19 RX ADMIN — FENTANYL CITRATE 50 MCG: 50 INJECTION, SOLUTION INTRAMUSCULAR; INTRAVENOUS at 11:19

## 2017-07-19 RX ADMIN — HEPARIN SODIUM 12000 UNITS: 10000 INJECTION, SOLUTION INTRAVENOUS; SUBCUTANEOUS at 09:06

## 2017-07-19 RX ADMIN — HYDROMORPHONE HYDROCHLORIDE 1 MG: 1 INJECTION, SOLUTION INTRAMUSCULAR; INTRAVENOUS; SUBCUTANEOUS at 09:43

## 2017-07-19 RX ADMIN — METOPROLOL TARTRATE 50 MG: 50 TABLET ORAL at 19:48

## 2017-07-19 RX ADMIN — FENTANYL CITRATE 50 MCG: 50 INJECTION, SOLUTION INTRAMUSCULAR; INTRAVENOUS at 12:38

## 2017-07-19 RX ADMIN — MIDAZOLAM HYDROCHLORIDE 2 MG: 1 INJECTION, SOLUTION INTRAMUSCULAR; INTRAVENOUS at 08:48

## 2017-07-19 RX ADMIN — MIDAZOLAM HYDROCHLORIDE 1 MG: 1 INJECTION, SOLUTION INTRAMUSCULAR; INTRAVENOUS at 11:07

## 2017-07-19 RX ADMIN — MIDAZOLAM HYDROCHLORIDE 1 MG: 1 INJECTION, SOLUTION INTRAMUSCULAR; INTRAVENOUS at 10:53

## 2017-07-19 RX ADMIN — NITROGLYCERIN 0.2 MG: 200 INJECTION, SOLUTION INTRAVENOUS at 09:24

## 2017-07-19 RX ADMIN — MIDAZOLAM HYDROCHLORIDE 2 MG: 1 INJECTION, SOLUTION INTRAMUSCULAR; INTRAVENOUS at 12:28

## 2017-07-19 RX ADMIN — HEPARIN SODIUM 1500 UNITS: 10000 INJECTION, SOLUTION INTRAVENOUS; SUBCUTANEOUS at 11:25

## 2017-07-19 RX ADMIN — HEPARIN SODIUM 3000 UNITS: 10000 INJECTION, SOLUTION INTRAVENOUS; SUBCUTANEOUS at 09:52

## 2017-07-19 NOTE — PROGRESS NOTES
TRANSFER - OUT REPORT:    Verbal report given to Kenna Valdovinos RN(name) on Ligia Carpio  being transferred to cpru(unit) for routine progression of care       Report consisted of patients Situation, Background, Assessment and   Recommendations(SBAR). Information from the following report(s) SBAR was reviewed with the receiving nurse. is allergic to meloxicam.    Opportunity for questions and clarification was provided. Procedure Summary:Pt had  via bilat groin. Both sides perclosed and sites covered with clear dsg.  2 stents placed in LAD.     Med Administration    Versed:  20 mg  Fentanyl: 300 mcg  Heparin: 62328 units      Visit Vitals    /76 (BP 1 Location: Left arm, BP Patient Position: Supine)    Pulse 78    Temp 97.8 °F (36.6 °C)    Resp 18    Ht 6' (1.829 m)    Wt 113.4 kg (250 lb)    SpO2 99%    BMI 33.91 kg/m2     Past Medical History:   Diagnosis Date    Abdominal pain     RLQ abdominal pain    Abnormal AST and ALT     Abnormal cardiovascular function study 5/2/2016    Anxiety 7/18/2016    Arthritis     Back pain     Back pain     Balantidiasis     Bilateral pain of leg and foot     Bronchitis     CAD (coronary artery disease)     2 blocked arteries    CAD S/P percutaneous coronary angioplasty     PCI to LCFx 2/9/09 NATIVE VESSEL      Cellulitis and abscess of left great toe     Chest pain     Chronic diarrhea 3/19/2013    Cough     Diabetes (HCC)     IDDM and oral:   -260      Diabetes mellitus type II, controlled ([facility] [address] 3/19/2013    Diarrhea, functional     Dizziness and giddiness     Dyslipidemia 3/19/2013    Hyperlipidemia    Dyspepsia and disorder of function of stomach     Dyspnea     Elevated liver function tests     Excessive sweating     Fatigue     Fatty liver     Gastrointestinal disorder     pancreatitis 3/2013    GERD (gastroesophageal reflux disease)     daily med    Heart failure (HCC)     hx of    Hemorrhage of rectum and anus  HTN (hypertension) 3/19/2013    Hypertension     IBS (irritable bowel syndrome)     Keratosis, actinic     Knee pain     Liver disease     fatty liver    Metatarsalgia of right foot     Nausea & vomiting     needs antiemetic and will do fine    Neuropathy (HCC)     Neutropenia ([facility] [address]     Nonalcoholic fatty liver disease     Obesity     Other ill-defined conditions     high cholesterol    Pain of left heel     Pancreatitis     Pharyngitis     Phlebitis after infusion     Plantar fasciitis of left foot     Proteinuria 12/4/14    Sinusitis     Thrombocytopenia (HCC)     Thromboembolus (HCC)     legs    Varicose veins     large varicose veins upper medial thigh           Peripheral IV 07/19/17 Right Antecubital (Active)   Site Assessment Clean, dry, & intact; Clean;Dry 7/19/2017  7:00 AM   Phlebitis Assessment 0 7/19/2017  7:00 AM   Infiltration Assessment 0 7/19/2017  7:00 AM   Dressing Status Clean, dry, & intact; Clean;Dry 7/19/2017  7:00 AM   Dressing Type Tape;Transparent 7/19/2017  7:00 AM       Peripheral IV 07/19/17 Left Wrist (Active)   Site Assessment Clean, dry, & intact; Clean;Dry 7/19/2017  7:00 AM   Phlebitis Assessment 0 7/19/2017  7:00 AM   Infiltration Assessment 0 7/19/2017  7:00 AM   Dressing Status Clean, dry, & intact; Clean;Dry 7/19/2017  7:00 AM   Dressing Type Tape;Transparent 7/19/2017  7:00 AM   Hub Color/Line Status Patent;Capped;Pink 7/19/2017  7:00 AM

## 2017-07-19 NOTE — PROGRESS NOTES
Patient transported to room 323 via cath lab RN. Bilateral groins remain intact with no bleeding or hematoma.  Wife at bedside

## 2017-07-19 NOTE — PROCEDURES
Brief Cardiac Procedure Note    Patient: Lilibeth Ray MRN: [id number]  SSN: [id number]    YOB: 1960  Age: 64 y.o. Sex: male      Date of Procedure: 7/19/2017     Pre-procedure Diagnosis: Coronary Artery Disease    Post-procedure Diagnosis: Coronary Artery Disease    Procedure: lad pci    Brief Description of Procedure: via r and lcfa    Performed By: Taylor Moise MD     Assistants:     Anesthesia: Moderate Sedation    Estimated Blood Loss: Less than 10 mL      Specimens: None    Implants: None    Findings:   100% lad>> 0% after 38 and 20 Synergy stents 3.25  + heparin  + perclose    Complications: None    Recommendations: Continue medical therapy.     Signed By: Taylor Moise MD     July 19, 2017

## 2017-07-19 NOTE — IP AVS SNAPSHOT
[address] 
[phone] Patient: Almaz Xie MRN: [id number] QNI:4[birth year]4/[birth year]66 Current Discharge Medication List  
  
START taking these medications Dose & Instructions Dispensing Information Comments Morning Noon Evening Bedtime  
 aspirin 81 mg chewable tablet Replaces:  aspirin delayed-release 325 mg tablet Your next dose is:  7/21 Dose:  81 mg Take 1 Tab by mouth daily. Quantity:  30 Tab Refills:  11 CONTINUE these medications which have CHANGED Dose & Instructions Dispensing Information Comments Morning Noon Evening Bedtime  
 clotrimazole-betamethasone topical cream  
Commonly known as:  AlejandraÃœberResearch What changed:   
- when to take this 
- reasons to take this 
- additional instructions 1 application 2 times daily Quantity:  30 g Refills:  4  
     
   
   
   
  
 omega-3 acid ethyl esters 1 gram capsule Commonly known as:  Kvng Ballard What changed:  when to take this Dose:  4 Cap Take 4 Caps by mouth four (4) times daily. Quantity:  360 Cap Refills:  3 VITAMIN B-12 1,000 mcg tablet Generic drug:  cyanocobalamin What changed:  Another medication with the same name was removed. Continue taking this medication, and follow the directions you see here. Dose:  1000 mcg Take 1,000 mcg by mouth daily. Refills:  0 CONTINUE these medications which have NOT CHANGED Dose & Instructions Dispensing Information Comments Morning Noon Evening Bedtime  
 amLODIPine 10 mg tablet Commonly known as:  Jeana Pall Your next dose is:  7/21 Dose:  10 mg Take 1 Tab by mouth daily. Indications: Hypertension Quantity:  90 Tab Refills:  3  
     
   
   
   
  
 benazepril 40 mg tablet Commonly known as:  LOTENSIN Your next dose is:  7/21  Dose:  40 mg  
 Take 1 Tab by mouth daily. Indications: Hypertension Quantity:  90 Tab Refills:  3  
     
   
   
   
  
 canagliflozin 300 mg tablet Commonly known as:  Rome Pears Your next dose is:  7/21 Dose:  300 mg Take 1 Tab by mouth daily. Quantity:  90 Tab Refills:  3  
     
   
   
   
  
 clopidogrel 75 mg Tab Commonly known as:  PLAVIX Your next dose is:  7/21 Dose:  75 mg Take 1 Tab by mouth daily. Quantity:  30 Tab Refills:  11  
     
   
   
   
  
 dicyclomine 10 mg capsule Commonly known as:  BENTYL Your next dose is:  7/20 Dose:  10 mg  
take 10 mg by mouth four (4) times daily. Refills:  0  
     
   
   
   
  
 dulaglutide 1.5 mg/0.5 mL sub-q pen Commonly known as:  TRULICITY Your next dose is:  Every 7 days Dose:  1.5 mg  
0.5 mL by SubCUTAneous route every seven (7) days. Quantity:  12 Pen Refills:  3  
     
   
   
   
  
 fenofibrate 160 mg tablet Commonly known as:  LOFIBRA Your next dose is:  7/21 Dose:  160 mg Take 1 Tab by mouth daily. Quantity:  90 Tab Refills:  3  
     
   
   
   
  
 folic acid 1 mg tablet Commonly known as:  Google Dose:  1 mg Take 1 Tab by mouth daily. Quantity:  90 Tab Refills:  3  
     
   
   
   
  
 glipiZIDE 10 mg tablet Commonly known as:  Jennifer Fong Your next dose is:  7/20 Dose:  10 mg Take 1 Tab by mouth two (2) times a day. Quantity:  180 Tab Refills:  3  
     
   
   
   
  
 isosorbide mononitrate 10 mg tablet Commonly known as:  ISMO, MONOKET Your next dose is:  7/20 Dose:  10 mg Take 1 Tab by mouth two (2) times a day. Quantity:  180 Tab Refills:  3  
     
   
   
   
  
 lansoprazole 15 mg capsule Commonly known as:  PREVACID Your next dose is:  7/21 Take  by mouth Daily (before breakfast). Refills:  0  
     
   
   
   
  
 metFORMIN 1,000 mg tablet Commonly known as:  GLUCOPHAGE  
 Your next dose is:  7/21 Dose:  1000 mg Take 1 Tab by mouth two (2) times daily (with meals). Quantity:  180 Tab Refills:  3 Use generic   
    
   
   
   
  
 metoprolol tartrate 100 mg IR tablet Commonly known as:  LOPRESSOR Your next dose is:  7/20 Dose:  100 mg Take 1 Tab by mouth two (2) times a day. Quantity:  180 Tab Refills:  3  
     
   
   
   
  
 multivitamin tablet Commonly known as:  ONE A DAY Dose:  1 Tab Take 1 Tab by mouth daily. Refills:  0  
     
   
   
   
  
 nitroglycerin 0.4 mg SL tablet Commonly known as:  NITROSTAT Dose:  0.4 mg  
1 Tab by SubLINGual route every five (5) minutes as needed for Chest Pain. Quantity:  3 Bottle Refills:  3  
     
   
   
   
  
 ondansetron hcl 4 mg tablet Commonly known as:  Solis Llanes Dose:  4 mg Take 4 mg by mouth. 1 tablet every 4-6 hours prn nausea and vomiting Refills:  0  
     
   
   
   
  
 potassium chloride 10 mEq tablet Commonly known as:  KLOR-CON Your next dose is:  7/21 Dose:  10 mEq Take 1 Tab by mouth daily. Quantity:  90 Tab Refills:  3  
     
   
   
   
  
 pravastatin 40 mg tablet Commonly known as:  PRAVACHOL Your next dose is:  7/21 Dose:  40 mg Take 1 Tab by mouth daily. Quantity:  90 Tab Refills:  3 STOP taking these medications   
 aspirin delayed-release 325 mg tablet Replaced by:  aspirin 81 mg chewable tablet Where to Get Your Medications Information on where to get these meds will be given to you by the nurse or doctor. ! Ask your nurse or doctor about these medications  
  aspirin 81 mg chewable tablet

## 2017-07-19 NOTE — PROGRESS NOTES
Patient admitted to floor. Assessed. Placed on monitor. Discussed POC. Verbalized understanding. Oriented to room. Call light within reach. Instructed to call for assist.  Also instructed to keep bilateral legs straight and immobile. Verbalized understanding.

## 2017-07-19 NOTE — PROGRESS NOTES
Report received from [facility] Cath Lab RN. Procedural findings communicated. Intra procedural  medication administration reviewed. Progression of care discussed.      Patient received into [address] [facility] 7 post sheath removal.     Access site without bleeding or swelling     Dressing dry and intact     Patient instructed to limit movement to bilateral lower extremity    Routine post procedural vital signs and site assessment initiated

## 2017-07-19 NOTE — PROGRESS NOTES
TRANSFER - IN REPORT:    [facility] on Almaz Xie  being received from Cath Lab (unit) for routine progression of care      Report consisted of patients Situation, Background, Assessment and   Recommendations(SBAR). Information from the following report(s) SBAR, Kardex, Procedure Summary and MAR was reviewed with the receiving nurse. Opportunity for questions and clarification was provided. Assessment completed upon patients arrival to unit and care assumed.

## 2017-07-19 NOTE — PROGRESS NOTES
Patient's noted to be sinus tach 110-115 bpm. Patient asymptomatic. Dr. Frank Huffman paged. New order received for Metoprolol 50mg QID. Will continue to monitor.

## 2017-07-19 NOTE — PROGRESS NOTES
TRANSFER - OUT REPORT:    Verbal report given to Lacey Coles RN(name) on Gonzalez White  being transferred to room 323(unit) for routine progression of care       Report consisted of patients Situation, Background, Assessment and   Recommendations(SBAR). Information from the following report(s) Procedure Summary was reviewed with the receiving nurse. Lines:   Peripheral IV 07/19/17 Right Antecubital (Active)   Site Assessment Clean, dry, & intact; Clean;Dry 7/19/2017  7:00 AM   Phlebitis Assessment 0 7/19/2017  7:00 AM   Infiltration Assessment 0 7/19/2017  7:00 AM   Dressing Status Clean, dry, & intact; Clean;Dry 7/19/2017  7:00 AM   Dressing Type Tape;Transparent 7/19/2017  7:00 AM       Peripheral IV 07/19/17 Left Wrist (Active)   Site Assessment Clean, dry, & intact; Clean;Dry 7/19/2017  7:00 AM   Phlebitis Assessment 0 7/19/2017  7:00 AM   Infiltration Assessment 0 7/19/2017  7:00 AM   Dressing Status Clean, dry, & intact; Clean;Dry 7/19/2017  7:00 AM   Dressing Type Tape;Transparent 7/19/2017  7:00 AM   Hub Color/Line Status Patent;Capped;Pink 7/19/2017  7:00 AM        Opportunity for questions and clarification was provided.       Patient transported with:   Monitor  Registered Nurse

## 2017-07-19 NOTE — PROGRESS NOTES
Patient received to [address] [place] [address] room # 3  Ambulatory from [facility]. Patient scheduled for  today with Dr Kimmy Alonzo. Procedure reviewed & questions answered, voiced good understanding consent obtained & placed on chart. All medications and medical history reviewed. Will prep patient per orders. Patient & family updated on plan of care.

## 2017-07-19 NOTE — PROGRESS NOTES
Dual admission skin assessment completed. Bilateral groin cath entry/exit sites. WDL. Dressings clean, dry, and intact. No other abnormalities noted.

## 2017-07-19 NOTE — IP AVS SNAPSHOT
Homar Mishra 
 
 
 [address] 
[phone] Patient: Max Almanza MRN: [id number] QBS:1/7/9683 You are allergic to the following Allergen Reactions Meloxicam Diarrhea Recent Documentation Height Weight BMI Smoking Status 1.829 m 110.8 kg 33.13 kg/m2 Never Smoker Emergency Contacts Name Discharge Info Relation Home Work Mobile [address] [place] CAREGIVER [3] Spouse [3] [phone] About your hospitalization You were admitted on:  July 19, 2017 You last received care in the:  [facility] 3 TELEMETRY You were discharged on:  July 20, 2017 Unit phone number:  [phone] Why you were hospitalized Your primary diagnosis was:  Not on File Your diagnoses also included:  Cad (Coronary Artery Disease) Providers Seen During Your Hospitalizations Provider Role Specialty Primary office phone Avinash Uribe MD Attending Provider Cardiology [phone] Your Primary Care Physician (PCP) Primary Care Physician Office Phone Office Fax Girish Crooks [phone] Follow-up Information Follow up With Details Comments Contact Info Mannie Barnes MD [facility] [address] [facility] [address] 
[phone] Bonnie Connell MD  As needed [address] [address] 
[phone] Mannie Barnes MD On 8/3/2017 at 2pm in [place] [facility] [address] [facility] [address] 
[phone] Your Appointments Thursday August 03, 2017  2:00 PM EDT HOSPITAL FOLLOW-UP with Mannie Barnes MD  
[facility] Cardiology ([address] [facility]) 2 [facility] 
[address] [place] [facility]  
[phone] Wednesday August 09, 2017  8:20 AM EDT Fasting lab with [facility] [address] [place] [address] ([address] [place] [address]) [address] [facility] 8[address] [facility]  
[phone] Thursday August 17, 2017  2:00 PM EDT Office Visit with Velna Cabot, MD  
[address] [place] [address] ([address] [place] [address]) [address] [facility] 2962 [facility]  
[phone] Current Discharge Medication List  
  
START taking these medications Dose & Instructions Dispensing Information Comments Morning Noon Evening Bedtime  
 aspirin 81 mg chewable tablet Replaces:  aspirin delayed-release 325 mg tablet Your next dose is:  7/21 Dose:  81 mg Take 1 Tab by mouth daily. Quantity:  30 Tab Refills:  11 CONTINUE these medications which have CHANGED Dose & Instructions Dispensing Information Comments Morning Noon Evening Bedtime  
 clotrimazole-betamethasone topical cream  
Commonly known as:  Mayank Sand What changed:   
- when to take this 
- reasons to take this 
- additional instructions 1 application 2 times daily Quantity:  30 g Refills:  4  
     
   
   
   
  
 omega-3 acid ethyl esters 1 gram capsule Commonly known as:  Munira Camel What changed:  when to take this Dose:  4 Cap Take 4 Caps by mouth four (4) times daily. Quantity:  360 Cap Refills:  3 VITAMIN B-12 1,000 mcg tablet Generic drug:  cyanocobalamin What changed:  Another medication with the same name was removed. Continue taking this medication, and follow the directions you see here. Dose:  1000 mcg Take 1,000 mcg by mouth daily. Refills:  0 CONTINUE these medications which have NOT CHANGED Dose & Instructions Dispensing Information Comments Morning Noon Evening Bedtime  
 amLODIPine 10 mg tablet Commonly known as:  Carchristine Tovares Your next dose is:  7/21 Dose:  10 mg Take 1 Tab by mouth daily. Indications: Hypertension Quantity:  90 Tab Refills:  3  
     
   
   
   
  
 benazepril 40 mg tablet Commonly known as:  LOTENSIN Your next dose is:  7/21 Dose:  40 mg Take 1 Tab by mouth daily. Indications: Hypertension Quantity:  90 Tab Refills:  3  
     
   
   
   
  
 canagliflozin 300 mg tablet Commonly known as:  Sherral Garibay Your next dose is:  7/21 Dose:  300 mg Take 1 Tab by mouth daily. Quantity:  90 Tab Refills:  3  
     
   
   
   
  
 clopidogrel 75 mg Tab Commonly known as:  PLAVIX Your next dose is:  7/21 Dose:  75 mg Take 1 Tab by mouth daily. Quantity:  30 Tab Refills:  11  
     
   
   
   
  
 dicyclomine 10 mg capsule Commonly known as:  BENTYL Your next dose is:  7/20 Dose:  10 mg  
take 10 mg by mouth four (4) times daily. Refills:  0  
     
   
   
   
  
 dulaglutide 1.5 mg/0.5 mL sub-q pen Commonly known as:  TRULICITY Your next dose is:  Every 7 days Dose:  1.5 mg  
0.5 mL by SubCUTAneous route every seven (7) days. Quantity:  12 Pen Refills:  3  
     
   
   
   
  
 fenofibrate 160 mg tablet Commonly known as:  LOFIBRA Your next dose is:  7/21 Dose:  160 mg Take 1 Tab by mouth daily. Quantity:  90 Tab Refills:  3  
     
   
   
   
  
 folic acid 1 mg tablet Commonly known as:  Ant Dose:  1 mg Take 1 Tab by mouth daily. Quantity:  90 Tab Refills:  3  
     
   
   
   
  
 glipiZIDE 10 mg tablet Commonly known as:  Cassandra Stade Your next dose is:  7/20 Dose:  10 mg Take 1 Tab by mouth two (2) times a day. Quantity:  180 Tab Refills:  3  
     
   
   
   
  
 isosorbide mononitrate 10 mg tablet Commonly known as:  ISMO, MONOKET Your next dose is:  7/20 Dose:  10 mg Take 1 Tab by mouth two (2) times a day. Quantity:  180 Tab Refills:  3 lansoprazole 15 mg capsule Commonly known as:  PREVACID Your next dose is:  7/21 Take  by mouth Daily (before breakfast). Refills:  0  
     
   
   
   
  
 metFORMIN 1,000 mg tablet Commonly known as:  GLUCOPHAGE Your next dose is:  7/21 Dose:  1000 mg Take 1 Tab by mouth two (2) times daily (with meals). Quantity:  180 Tab Refills:  3 Use generic   
    
   
   
   
  
 metoprolol tartrate 100 mg IR tablet Commonly known as:  LOPRESSOR Your next dose is:  7/20 Dose:  100 mg Take 1 Tab by mouth two (2) times a day. Quantity:  180 Tab Refills:  3  
     
   
   
   
  
 multivitamin tablet Commonly known as:  ONE A DAY Dose:  1 Tab Take 1 Tab by mouth daily. Refills:  0  
     
   
   
   
  
 nitroglycerin 0.4 mg SL tablet Commonly known as:  NITROSTAT Dose:  0.4 mg  
1 Tab by SubLINGual route every five (5) minutes as needed for Chest Pain. Quantity:  3 Bottle Refills:  3  
     
   
   
   
  
 ondansetron hcl 4 mg tablet Commonly known as:  Scheryl Manna Dose:  4 mg Take 4 mg by mouth. 1 tablet every 4-6 hours prn nausea and vomiting Refills:  0  
     
   
   
   
  
 potassium chloride 10 mEq tablet Commonly known as:  KLOR-CON Your next dose is:  7/21 Dose:  10 mEq Take 1 Tab by mouth daily. Quantity:  90 Tab Refills:  3  
     
   
   
   
  
 pravastatin 40 mg tablet Commonly known as:  PRAVACHOL Your next dose is:  7/21 Dose:  40 mg Take 1 Tab by mouth daily. Quantity:  90 Tab Refills:  3 STOP taking these medications   
 aspirin delayed-release 325 mg tablet Replaced by:  aspirin 81 mg chewable tablet Where to Get Your Medications Information on where to get these meds will be given to you by the nurse or doctor. ! Ask your nurse or doctor about these medications  
  aspirin 81 mg chewable tablet Discharge Instructions Restart metformin in am 7/21/17 Percutaneous Coronary Intervention: What to Expect at [facility] Your Recovery Percutaneous coronary intervention (PCI) is the name for procedures that are used to open a narrowed or blocked coronary artery. The two most common PCI procedures are coronary angioplasty and coronary stent placement. Your groin or arm may have a bruise and feel sore for a day or two after a percutaneous coronary intervention (PCI). You can do light activities around the house, but nothing strenuous for several days. This care sheet gives you a general idea about how long it will take for you to recover. But each person recovers at a different pace. Follow the steps below to get better as quickly as possible. How can you care for yourself at home? Activity · If the doctor gave you a sedative: ¨ For 24 hours, don't do anything that requires attention to detail. It takes time for the medicine's effects to completely wear off. ¨ For your safety, do not drive or operate any machinery that could be dangerous. Wait until the medicine wears off and you can think clearly and react easily. · Do not do strenuous exercise and do not lift, pull, or push anything heavy until your doctor says it is okay. This may be for a day or two. You can walk around the house and do light activity, such as cooking. · If the catheter was placed in your groin, try not to walk up stairs for the first couple of days. · If the catheter was placed in your arm near your wrist, do not bend your wrist deeply for the first couple of days. Be careful using your hand to get into and out of a chair or bed. · Carry your stent identification card with you at all times. · If your doctor recommends it, get more exercise. Walking is a good choice. Bit by bit, increase the amount you walk every day. Try for at least 30 minutes on most days of the week. Diet · Drink plenty of fluids to help your body flush out the dye. If you have kidney, heart, or liver disease and have to limit fluids, talk with your doctor before you increase the amount of fluids you drink. · Keep eating a heart-healthy diet that has lots of fruits, vegetables, and whole grains. If you have not been eating this way, talk to your doctor. You also may want to talk to a dietitian. This expert can help you to learn about healthy foods and plan meals. Medicines · Your doctor will tell you if and when you can restart your medicines. He or she will also give you instructions about taking any new medicines. · If you take blood thinners, such as warfarin (Coumadin), clopidogrel (Plavix), or aspirin, be sure to talk to your doctor. He or she will tell you if and when to start taking those medicines again. Make sure that you understand exactly what your doctor wants you to do. · Your doctor will prescribe blood-thinning medicines. You will likely take aspirin plus another antiplatelet, such as clopidogrel (Plavix). It is very important that you take these medicines exactly as directed. These medicines help keep the coronary artery open and reduce your risk of a heart attack. · Call your doctor if you think you are having a problem with your medicine. Care of the catheter site · For 1 or 2 days, keep a bandage over the spot where the catheter was inserted. The bandage probably will fall off in this time. · Put ice or a cold pack on the area for 10 to 20 minutes at a time to help with soreness or swelling. Put a thin cloth between the ice and your skin. · You may shower 24 to 48 hours after the procedure, if your doctor okays it. Pat the incision dry. · Do not soak the catheter site until it is healed. Don't take a bath for 1 week, or until your doctor tells you it is okay. Follow-up care is a key part of your treatment and safety.  Be sure to make and go to all appointments, and call your doctor if you are having problems. It's also a good idea to know your test results and keep a list of the medicines you take. When should you call for help? Call 911 anytime you think you may need emergency care. For example, call if: 
· You passed out (lost consciousness). · You have severe trouble breathing. · You have sudden chest pain and shortness of breath, or you cough up blood. · You have symptoms of a heart attack, such as: ¨ Chest pain or pressure. ¨ Sweating. ¨ Shortness of breath. ¨ Nausea or vomiting. ¨ Pain that spreads from the chest to the neck, jaw, or one or both shoulders or arms. ¨ Dizziness or lightheadedness. ¨ A fast or uneven pulse. After calling 911, chew 1 adult-strength aspirin. Wait for an ambulance. Do not try to drive yourself. · You have been diagnosed with angina, and you have angina symptoms that do not go away with rest or are not getting better within 5 minutes after you take one dose of nitroglycerin. Call your doctor now or seek immediate medical care if: 
· You are bleeding from the area where the catheter was put in your artery. · You have a fast-growing, painful lump at the catheter site. · You have signs of infection, such as: 
¨ Increased pain, swelling, warmth, or redness. ¨ Red streaks leading from the catheter site. ¨ Pus draining from the catheter site. ¨ A fever. · Your leg or arm looks blue or feels cold, numb, or tingly. Watch closely for changes in your health, and be sure to contact your doctor if you have any problems. Where can you learn more? Go to http://daniele-shayy.info/. Enter N239 in the search box to learn more about \"Percutaneous Coronary Intervention: What to Expect at Home. \" Current as of: January 13, 2017 Content Version: 11.3 © 5006-4544 [employer].  Care instructions adapted under license by [employer] (which disclaims liability or warranty for this information). If you have questions about a medical condition or this instruction, always ask your healthcare professional. [address] any warranty or liability for your use of this information. Discharge Orders Procedure Order Date Status Priority Quantity Spec Type Associated Dx REFERRAL TO CARDIAC REHAB 07/20/17 0830 Normal Routine 1 [employer] Announcement We are excited to announce that we are making your provider's discharge notes available to you in [employer]. You will see these notes when they are completed and signed by the physician that discharged you from your recent hospital stay. If you have any questions or concerns about any information you see in [employer], please call the Health Information Department where you were seen or reach out to your Primary Care Provider for more information about your plan of care. Introducing hospitals & HEALTH SERVICES! Dear Mirta Juares: Thank you for requesting a [employer] account. Our records indicate that you already have an active [employer] account. You can access your account anytime at [email or web address] Did you know that you can access your hospital and ER discharge instructions at any time in [employer]? You can also review all of your test results from your hospital stay or ER visit. Additional Information If you have questions, please visit the Frequently Asked Questions section of the [employer] website at [email or web address] Remember, [employer] is NOT to be used for urgent needs. For medical emergencies, dial 911. Now available from your iPhone and Android! General Information Please provide this summary of care documentation to your next provider. Patient Signature:  ____________________________________________________________ Date:  ____________________________________________________________  
  
[place] Provider Signature:  ____________________________________________________________ Date:  ____________________________________________________________

## 2017-07-20 VITALS
DIASTOLIC BLOOD PRESSURE: 74 MMHG | HEIGHT: 72 IN | OXYGEN SATURATION: 95 % | WEIGHT: 244.3 LBS | BODY MASS INDEX: 33.09 KG/M2 | RESPIRATION RATE: 20 BRPM | SYSTOLIC BLOOD PRESSURE: 135 MMHG | HEART RATE: 86 BPM | TEMPERATURE: 97.6 F

## 2017-07-20 LAB
ANION GAP BLD CALC-SCNC: 10 MMOL/L (ref 7–16)
BUN SERPL-MCNC: 13 MG/DL (ref 6–23)
CALCIUM SERPL-MCNC: 8.6 MG/DL (ref 8.3–10.4)
CHLORIDE SERPL-SCNC: 108 MMOL/L (ref 98–107)
CO2 SERPL-SCNC: 23 MMOL/L (ref 21–32)
CREAT SERPL-MCNC: 0.68 MG/DL (ref 0.8–1.5)
ERYTHROCYTE [DISTWIDTH] IN BLOOD BY AUTOMATED COUNT: 14.5 % (ref 11.9–14.6)
GLUCOSE SERPL-MCNC: 140 MG/DL (ref 65–100)
HCT VFR BLD AUTO: 43.6 % (ref 41.1–50.3)
HGB BLD-MCNC: 14 G/DL (ref 13.6–17.2)
MCH RBC QN AUTO: 27.2 PG (ref 26.1–32.9)
MCHC RBC AUTO-ENTMCNC: 32.1 G/DL (ref 31.4–35)
MCV RBC AUTO: 84.7 FL (ref 79.6–97.8)
PLATELET # BLD AUTO: 143 K/UL (ref 150–450)
PMV BLD AUTO: 10.1 FL (ref 10.8–14.1)
POTASSIUM SERPL-SCNC: 4.1 MMOL/L (ref 3.5–5.1)
RBC # BLD AUTO: 5.15 M/UL (ref 4.23–5.67)
SODIUM SERPL-SCNC: 141 MMOL/L (ref 136–145)
WBC # BLD AUTO: 5.9 K/UL (ref 4.3–11.1)

## 2017-07-20 PROCEDURE — 74011250637 HC RX REV CODE- 250/637: Performed by: INTERNAL MEDICINE

## 2017-07-20 PROCEDURE — 85027 COMPLETE CBC AUTOMATED: CPT | Performed by: INTERNAL MEDICINE

## 2017-07-20 PROCEDURE — 99218 HC RM OBSERVATION: CPT

## 2017-07-20 PROCEDURE — 80048 BASIC METABOLIC PNL TOTAL CA: CPT | Performed by: INTERNAL MEDICINE

## 2017-07-20 PROCEDURE — 36415 COLL VENOUS BLD VENIPUNCTURE: CPT | Performed by: INTERNAL MEDICINE

## 2017-07-20 RX ORDER — GUAIFENESIN 100 MG/5ML
81 LIQUID (ML) ORAL DAILY
Qty: 30 TAB | Refills: 11 | Status: SHIPPED
Start: 2017-07-20

## 2017-07-20 RX ADMIN — CLOPIDOGREL BISULFATE 75 MG: 75 TABLET ORAL at 08:04

## 2017-07-20 RX ADMIN — AMLODIPINE BESYLATE 10 MG: 10 TABLET ORAL at 08:04

## 2017-07-20 RX ADMIN — PRAVASTATIN SODIUM 40 MG: 20 TABLET ORAL at 08:04

## 2017-07-20 RX ADMIN — FENOFIBRATE 160 MG: 160 TABLET ORAL at 08:04

## 2017-07-20 RX ADMIN — ASPIRIN 81 MG 81 MG: 81 TABLET ORAL at 08:04

## 2017-07-20 RX ADMIN — METOPROLOL TARTRATE 50 MG: 50 TABLET ORAL at 08:04

## 2017-07-20 RX ADMIN — GLIPIZIDE 10 MG: 5 TABLET ORAL at 08:03

## 2017-07-20 RX ADMIN — BENAZEPRIL HYDROCHLORIDE 40 MG: 10 TABLET, FILM COATED ORAL at 08:03

## 2017-07-20 NOTE — DISCHARGE SUMMARY
[facility] Discharge Summary     Patient ID:  Courtney Rodriguez  [id number]  87 y.o.  1960    Admit date: 7/19/2017    Discharge date:  7/20/17    Admitting Physician: Riri Browning MD     Discharge Physician: Sarah Sarmiento NP/Dr. Nighat Sims    Admission Diagnoses: heart cath  CAD (coronary artery disease)    Discharge Diagnoses:   Patient Active Problem List    Diagnosis Date Noted    Coronary artery disease of native artery of native heart with stable angina pectoris ([facility] [address] 06/16/2017    Obesity 07/18/2016    Sinusitis 07/18/2016    GERD (gastroesophageal reflux disease) 07/18/2016    Anxiety 07/18/2016    CAD (coronary artery disease)     Elevated liver function tests     Balantidiasis     IBS (irritable bowel syndrome)     Neuropathy (HCC)     Dyspepsia and disorder of function of stomach     Nonalcoholic fatty liver disease     Abdominal pain     Abnormal cardiovascular function study 05/02/2016    Acute pancreatitis 03/19/2013    Diabetes mellitus type II, controlled ([facility] [address] 03/19/2013    HTN (hypertension) 03/19/2013    Dyslipidemia 03/19/2013    CAD S/P percutaneous coronary angioplasty 03/19/2013    Chronic diarrhea 03/19/2013       Cardiology Procedures this admission:  Left heart catheterization with PCI  Consults: None    Hospital Course: Patient was seen at the office of [facility] by Dr. Shelia Fernandez for complaints of chronic chest pain with known  of LAD. The patient was subsequently scheduled for a LHC at [facility] on 7/19/17. Patient underwent cardiac catheterization by Dr. Shelia Fernandez. Patient's LAD was stented with a 2.5 x 38 mm and 3.00 x 20 mm Synergy AMANDA with 0% residual stenosis. Patient tolerated the procedure well and was taken to the telemetry floor for recovery. The following morning patient was up feeling well without any complaints of chest pain or shortness of breath.  Patient's right/left femoral cath site was clean, dry and intact without hematoma or bruit. Patient's labs were WNL. Patient was seen and examined by Dr. Mariusz Elizabeth and determined stable and ready for discharge. Patient was instructed on the importance of medication compliance including taking aspirin and plavix everyday without missing a dose. After receiving drug eluting stents, the patient will remain on dual anti-platelet therapy for 1 year. For maximized medical therapy for CAD, patient will continue BB, ACE-I, and statin as well. The patient will follow up with [address] [place] [address] [facility]  on 8/3/17 at 2 pm in [place] office and has been referred to cardiac rehab. The patient will need a BMP in 3 days. The patient will restart metformin tomorrow. DISPOSITION: The patient is being discharged home in stable condition on a low saturated fat, low cholesterol and low salt diet. The patient is instructed to advance activities as tolerated to the limit of fatigue or shortness of breath. The patient is instructed to avoid all heavy lifting, straining, stooping or squatting for 3-5 days. The patient is instructed to watch the cath site for bleeding/oozing; if seen, the patient is instructed to apply firm pressure with a clean cloth and call [address] [place] [address] [facility] at [phone]. The patient is instructed to watch for signs of infection which include: increasing area of redness, fever/hot to touch or purulent drainage at the catheterization site. The patient is instructed not to soak in a bathtub for 7-10 days, but is cleared to shower. The patient is instructed to call the office or return to the ER for immediate evaluation for any shortness of breath or chest pain not relieved by NTG. Discharge Exam:   Visit Vitals    /69    Pulse 86    Temp 97.9 °F (36.6 °C)    Resp 18    Ht 6' (1.829 m)    Wt 110.8 kg (244 lb 4.8 oz)    SpO2 97%    BMI 33.13 kg/m2     Patient has been seen by Dr. Maliha Kwong: see his progress note for exam details.     Recent Results (from the past 24 hour(s))   POC ACTIVATED CLOTTING TIME    Collection Time: 07/19/17  9:46 AM   Result Value Ref Range    Activated Clotting Time (POC) 219 (H) 70 - 128 SECS   POC ACTIVATED CLOTTING TIME    Collection Time: 07/19/17 10:03 AM   Result Value Ref Range    Activated Clotting Time (POC) 290 (H) 70 - 128 SECS   POC ACTIVATED CLOTTING TIME    Collection Time: 07/19/17 10:41 AM   Result Value Ref Range    Activated Clotting Time (POC) 307 (H) 70 - 128 SECS   POC ACTIVATED CLOTTING TIME    Collection Time: 07/19/17 11:17 AM   Result Value Ref Range    Activated Clotting Time (POC) 285 (H) 70 - 128 SECS   POC ACTIVATED CLOTTING TIME    Collection Time: 07/19/17 11:50 AM   Result Value Ref Range    Activated Clotting Time (POC) 257 (H) 70 - 128 SECS   POC ACTIVATED CLOTTING TIME    Collection Time: 07/19/17 12:24 PM   Result Value Ref Range    Activated Clotting Time (POC) 268 (H) 70 - 128 SECS   GLUCOSE, POC    Collection Time: 07/19/17  3:45 PM   Result Value Ref Range    Glucose (POC) 166 (H) 65 - 100 mg/dL   CBC W/O DIFF    Collection Time: 07/20/17  7:07 AM   Result Value Ref Range    WBC 5.9 4.3 - 11.1 K/uL    RBC 5.15 4.23 - 5.67 M/uL    HGB 14.0 13.6 - 17.2 g/dL    HCT 43.6 41.1 - 50.3 %    MCV 84.7 79.6 - 97.8 FL    MCH 27.2 26.1 - 32.9 PG    MCHC 32.1 31.4 - 35.0 g/dL    RDW 14.5 11.9 - 14.6 %    PLATELET 212 (L) 933 - 450 K/uL    MPV 10.1 (L) 10.8 - 36.1 FL   METABOLIC PANEL, BASIC    Collection Time: 07/20/17  7:07 AM   Result Value Ref Range    Sodium 141 136 - 145 mmol/L    Potassium 4.1 3.5 - 5.1 mmol/L    Chloride 108 (H) 98 - 107 mmol/L    CO2 23 21 - 32 mmol/L    Anion gap 10 7 - 16 mmol/L    Glucose 140 (H) 65 - 100 mg/dL    BUN 13 6 - 23 MG/DL    Creatinine 0.68 (L) 0.8 - 1.5 MG/DL    GFR est AA >60 >60 ml/min/1.73m2    GFR est non-AA >60 >60 ml/min/1.73m2    Calcium 8.6 8.3 - 10.4 MG/DL         Patient Instructions:     Current Discharge Medication List      START taking these medications    Details   aspirin 81 mg chewable tablet Take 1 Tab by mouth daily. Qty: 30 Tab, Refills: 11         CONTINUE these medications which have NOT CHANGED    Details   clopidogrel (PLAVIX) 75 mg tab Take 1 Tab by mouth daily. Qty: 30 Tab, Refills: 11      multivitamin (ONE A DAY) tablet Take 1 Tab by mouth daily. cyanocobalamin (VITAMIN B-12) 1,000 mcg tablet Take 1,000 mcg by mouth daily. glipiZIDE (GLUCOTROL) 10 mg tablet Take 1 Tab by mouth two (2) times a day. Qty: 180 Tab, Refills: 3    Associated Diagnoses: Uncontrolled type 2 diabetes mellitus without complication, without long-term current use of insulin (HCC)      dulaglutide (TRULICITY) 1.5 TW/5.4 mL sub-q pen 0.5 mL by SubCUTAneous route every seven (7) days. Qty: 12 Pen, Refills: 3    Associated Diagnoses: Uncontrolled type 2 diabetes mellitus without complication, without long-term current use of insulin (HCC)      fenofibrate (LOFIBRA) 160 mg tablet Take 1 Tab by mouth daily. Qty: 90 Tab, Refills: 3    Associated Diagnoses: Hyperlipidemia, unspecified hyperlipidemia type      canagliflozin (INVOKANA) 300 mg tablet Take 1 Tab by mouth daily. Qty: 90 Tab, Refills: 3    Associated Diagnoses: Uncontrolled type 2 diabetes mellitus without complication, without long-term current use of insulin (HCC)      pravastatin (PRAVACHOL) 40 mg tablet Take 1 Tab by mouth daily. Qty: 90 Tab, Refills: 3    Associated Diagnoses: Hyperlipidemia, unspecified hyperlipidemia type      folic acid (FOLVITE) 1 mg tablet Take 1 Tab by mouth daily. Qty: 90 Tab, Refills: 3    Associated Diagnoses: Neuropathy (HCC)      omega-3 acid ethyl esters (LOVAZA) 1 gram capsule Take 4 Caps by mouth four (4) times daily. Qty: 360 Cap, Refills: 3      potassium chloride (K-DUR, KLOR-CON) 10 mEq tablet Take 1 Tab by mouth daily. Qty: 90 Tab, Refills: 3    Associated Diagnoses: Essential hypertension      amLODIPine (NORVASC) 10 mg tablet Take 1 Tab by mouth daily.  Indications: Hypertension  Qty: 90 Tab, Refills: 3    Associated Diagnoses: Acute angina (HCC)      benazepril (LOTENSIN) 40 mg tablet Take 1 Tab by mouth daily. Indications: Hypertension  Qty: 90 Tab, Refills: 3    Associated Diagnoses: Acute angina (HCC)      clotrimazole-betamethasone (LOTRISONE) topical cream 1 application 2 times daily  Qty: 30 g, Refills: 4      metFORMIN (GLUCOPHAGE) 1,000 mg tablet Take 1 Tab by mouth two (2) times daily (with meals). Qty: 180 Tab, Refills: 3    Comments: Use generic       metoprolol tartrate (LOPRESSOR) 100 mg IR tablet Take 1 Tab by mouth two (2) times a day. Qty: 180 Tab, Refills: 3      ondansetron hcl (ZOFRAN) 4 mg tablet Take 4 mg by mouth. 1 tablet every 4-6 hours prn nausea and vomiting      DICYCLOMINE 10 mg capsule take 10 mg by mouth four (4) times daily. lansoprazole (PREVACID) 15 mg capsule Take  by mouth Daily (before breakfast). isosorbide mononitrate (ISMO, MONOKET) 10 mg tablet Take 1 Tab by mouth two (2) times a day. Qty: 180 Tab, Refills: 3    Associated Diagnoses: Other chest pain      nitroglycerin (NITROSTAT) 0.4 mg SL tablet 1 Tab by SubLINGual route every five (5) minutes as needed for Chest Pain. Qty: 3 Bottle, Refills: 3         STOP taking these medications- The medications below are not home meds.         ASPIRIN DELAYED-RELEASE 325 mg tablet Comments:   Reason for Stopping:         ranolazine ER (RANEXA) 500 mg SR tablet Comments:   Reason for Stopping:         cyanocobalamin, vitamin B-12, 1,000 mcg/mL kit Comments:   Reason for Stopping:         traMADol (ULTRAM) 50 mg tablet Comments:   Reason for Stopping:         HYDROcodone-acetaminophen (NORCO) 7.5-325 mg per tablet Comments:   Reason for Stopping:               Signed:  Rosangela Sequeira, NP-C  7/20/2017  7:13 AM

## 2017-07-20 NOTE — DISCHARGE INSTRUCTIONS
Restart metformin in am 7/21/17     Percutaneous Coronary Intervention: What to Expect at [facility]    Percutaneous coronary intervention (PCI) is the name for procedures that are used to open a narrowed or blocked coronary artery. The two most common PCI procedures are coronary angioplasty and coronary stent placement. Your groin or arm may have a bruise and feel sore for a day or two after a percutaneous coronary intervention (PCI). You can do light activities around the house, but nothing strenuous for several days. This care sheet gives you a general idea about how long it will take for you to recover. But each person recovers at a different pace. Follow the steps below to get better as quickly as possible. How can you care for yourself at home? Activity  · If the doctor gave you a sedative:  ¨ For 24 hours, don't do anything that requires attention to detail. It takes time for the medicine's effects to completely wear off. ¨ For your safety, do not drive or operate any machinery that could be dangerous. Wait until the medicine wears off and you can think clearly and react easily. · Do not do strenuous exercise and do not lift, pull, or push anything heavy until your doctor says it is okay. This may be for a day or two. You can walk around the house and do light activity, such as cooking. · If the catheter was placed in your groin, try not to walk up stairs for the first couple of days. · If the catheter was placed in your arm near your wrist, do not bend your wrist deeply for the first couple of days. Be careful using your hand to get into and out of a chair or bed. · Carry your stent identification card with you at all times. · If your doctor recommends it, get more exercise. Walking is a good choice. Bit by bit, increase the amount you walk every day. Try for at least 30 minutes on most days of the week. Diet  · Drink plenty of fluids to help your body flush out the dye.  If you have kidney, heart, or liver disease and have to limit fluids, talk with your doctor before you increase the amount of fluids you drink. · Keep eating a heart-healthy diet that has lots of fruits, vegetables, and whole grains. If you have not been eating this way, talk to your doctor. You also may want to talk to a dietitian. This expert can help you to learn about healthy foods and plan meals. Medicines  · Your doctor will tell you if and when you can restart your medicines. He or she will also give you instructions about taking any new medicines. · If you take blood thinners, such as warfarin (Coumadin), clopidogrel (Plavix), or aspirin, be sure to talk to your doctor. He or she will tell you if and when to start taking those medicines again. Make sure that you understand exactly what your doctor wants you to do. · Your doctor will prescribe blood-thinning medicines. You will likely take aspirin plus another antiplatelet, such as clopidogrel (Plavix). It is very important that you take these medicines exactly as directed. These medicines help keep the coronary artery open and reduce your risk of a heart attack. · Call your doctor if you think you are having a problem with your medicine. Care of the catheter site  · For 1 or 2 days, keep a bandage over the spot where the catheter was inserted. The bandage probably will fall off in this time. · Put ice or a cold pack on the area for 10 to 20 minutes at a time to help with soreness or swelling. Put a thin cloth between the ice and your skin. · You may shower 24 to 48 hours after the procedure, if your doctor okays it. Pat the incision dry. · Do not soak the catheter site until it is healed. Don't take a bath for 1 week, or until your doctor tells you it is okay. Follow-up care is a key part of your treatment and safety. Be sure to make and go to all appointments, and call your doctor if you are having problems.  It's also a good idea to know your test results and keep a list of the medicines you take. When should you call for help? Call 911 anytime you think you may need emergency care. For example, call if:  · You passed out (lost consciousness). · You have severe trouble breathing. · You have sudden chest pain and shortness of breath, or you cough up blood. · You have symptoms of a heart attack, such as:  ¨ Chest pain or pressure. ¨ Sweating. ¨ Shortness of breath. ¨ Nausea or vomiting. ¨ Pain that spreads from the chest to the neck, jaw, or one or both shoulders or arms. ¨ Dizziness or lightheadedness. ¨ A fast or uneven pulse. After calling 911, chew 1 adult-strength aspirin. Wait for an ambulance. Do not try to drive yourself. · You have been diagnosed with angina, and you have angina symptoms that do not go away with rest or are not getting better within 5 minutes after you take one dose of nitroglycerin. Call your doctor now or seek immediate medical care if:  · You are bleeding from the area where the catheter was put in your artery. · You have a fast-growing, painful lump at the catheter site. · You have signs of infection, such as:  ¨ Increased pain, swelling, warmth, or redness. ¨ Red streaks leading from the catheter site. ¨ Pus draining from the catheter site. ¨ A fever. · Your leg or arm looks blue or feels cold, numb, or tingly. Watch closely for changes in your health, and be sure to contact your doctor if you have any problems. Where can you learn more? Go to http://daniele-shayy.info/. Enter U254 in the search box to learn more about \"Percutaneous Coronary Intervention: What to Expect at Home. \"  Current as of: January 13, 2017  Content Version: 11.3  © 5322-0762 [employer]. Care instructions adapted under license by [employer] (which disclaims liability or warranty for this information).  If you have questions about a medical condition or this instruction, always ask your healthcare professional. [address] any warranty or liability for your use of this information.

## 2017-07-20 NOTE — PROGRESS NOTES
Cardiac rehab: chart reviewed, appropriate for outpatient cardiac rehab. Patient qualifies for cardiac rehab with: PCI. Discussion of benefits of outpatient cardiac rehab provided and time allowed for questions. Education topics included: exercise, diet, and lifestyle changes to improve heart health. Pt is interested in participating at [address] [facility]. Pt will be contacted following discharge to schedule orientation appointment.

## 2017-07-20 NOTE — PROGRESS NOTES
Bedside and Verbal shift change report given to Jaron Dunham RN (oncoming nurse) by self Veda toledo). Report included the following information SBAR, Kardex, MAR and Recent Results.

## 2017-07-20 NOTE — PROGRESS NOTES
Discharge instructions reviewed with patient. Prescriptions given for no new prescriptions given and med info sheets provided for all new medications. Opportunity for questions provided. Patient voiced understanding of all discharge instructions. IVs removed and monitor off at discharge.

## 2017-07-20 NOTE — PROGRESS NOTES
Bedside shift change report received from [address]. Report included the following information SBAR, Kardex, MAR and Recent Results.

## 2017-07-24 NOTE — PROCEDURES
[address]       Name:  Tab Welsh   MR#:  [id number]   :  1960   Account #:  [de-identified]   Date of Adm:  2017       DATE OF PROCEDURE: 2017      PROCEDURE PERFORMED: Chronic total occlusion angioplasty of    left anterior descending. HISTORY: This is a 60-year-old gentleman with a known   chronically occluded LAD. He has lifestyle limiting angina   despite of good medical therapy. He has had a recent heart cath   in preparation for today's attempted  angioplasty. START TIME: 8:30 a.m.   END TIME: 1:30 p.m. CIRCULATOR: Geovanny Maki RN.      CONSCIOUS SEDATION: Total dose of Versed administered during the   procedure was 20 mg and total of 2 of Dilaudid and 300 of   fentanyl. TOTAL CONTRAST LOAD: 420 estimation. There was a good bit of   Wastage , however. FLUOROSCOPY TIME: 80 minutes. COBOS DOSE ADMINISTERED: 4. PROCEDURE: Using a micropuncture technique, a 45 cm long 8-  Moroccan Terumo sheath was inserted in the right common femoral   artery and the left common femoral artery after preclosure with   2 Perclose devices. The retrograde catheter was an 8-Moroccan AL1   with side holes which was shortened by standard technique. The   antegrade catheter was an 8-Moroccan 3.5 XB. The patient was   anticoagulated with heparin to maintain a target ACT greater   than 300 seconds. A suitable interventional collateral was   visualized and wired with a Corsair catheter over a Scion wire. This would not advance through the septal, so a 1.2 balloon   angioplasty was performed. This allowed for advancement of the   Corsair catheter into the occluded mid to distal LAD. A 200   wire was then advanced to the distal cap and the Corsair   catheter was advanced but would not advance into the total   occlusion. The total occlusion was able to be crossed in a   retrograde fashion using a Confianza Pro wire.  This was changed   out for a Fielder XT which remained luminal through the left   main into the aorta. In spite of a prolonged attempt, the   retrograde wire would not enter into the XB guide catheter even   with the use of a GuideLiner. The wire was eventually trapped   with an EN Snare device and pulled into the XB guide. Again, the   Corsair would not go through the total occlusion, so the   retrograde wire was externalized and then over this externalized   wire, the chronically occluded vessel was ballooned with a 2.0   mm balloon. Once this was accomplished via a Twin-Pass catheter,   a Runthrough wire was able to be advanced to the distal LAD. Using   great care under direct visualization not to cause any injury,   the retrograde externalized wire was removed into the Corsair   catheter and the Corsair catheter was removed. Angiography showed   some evidence of intracavitary septal perforation. This did not   appear to be a severe life-threatening problem. Over the   antegrade wire, the vessel was further predilated and then   stented with a 38 and 20 mm long Synergy stents postdilated to a   high pressure of 3.25 mm. Repeat angiography showed a nice final   result. The case was stopped at this point. The sheaths were   removed and the Perclose devices were deployed and there was   good hemostasis. FINDINGS: Prior to intervention, the left main is normal. It   divides into the LAD and left circumflex. The LAD is totally occluded after a first septal. There is   retrograde filling by collateral flow from the right. The left circumflex has been previously stented with no   restenosis. There is focal disease of a distal branch. The right coronary artery is a big vessel. There is   atherosclerosis but no severe stenosis identified. Post angioplasty and stenting, the LAD is now widely patent. The   stent is well deployed. There is no residual narrowing. There is   AUGUSTO 3 final flow. IMPRESSION   1.  Coronary artery disease as described. 2. Successful chronically total occlusion angioplasty of a   chronically occluded left anterior descending is performed as   described above.          MD Karol Goldman / Prema Manzano   D:  07/19/2017   14:41   T:  07/24/2017   16:52   Job #:  [id number]

## 2018-03-12 PROBLEM — R07.2 PRECORDIAL PAIN: Status: ACTIVE | Noted: 2018-03-12

## 2018-03-12 PROBLEM — E11.21 TYPE 2 DIABETES WITH NEPHROPATHY (HCC): Status: ACTIVE | Noted: 2018-03-12

## 2018-09-17 PROBLEM — I25.118 CORONARY ARTERY DISEASE OF NATIVE ARTERY OF NATIVE HEART WITH STABLE ANGINA PECTORIS (HCC): Status: RESOLVED | Noted: 2017-06-16 | Resolved: 2018-09-17

## 2020-02-26 ENCOUNTER — HOSPITAL ENCOUNTER (OUTPATIENT)
Dept: ULTRASOUND IMAGING | Age: 60
Discharge: HOME | End: 2020-02-26
Attending: INTERNAL MEDICINE
Payer: COMMERCIAL

## 2020-02-26 DIAGNOSIS — R10.11 RIGHT UPPER QUADRANT ABDOMINAL PAIN: ICD-10-CM

## 2020-02-26 PROCEDURE — 76705 ECHO EXAM OF ABDOMEN: CPT

## 2020-12-28 NOTE — PROGRESS NOTES
"  Physical Therapy Daily Treatment Note     Name: Mirian العلي  Clinic Number: [id number]    Therapy Diagnosis:   Encounter Diagnoses   Name Primary?    Decreased right shoulder range of motion     Muscle weakness of right upper extremity      Physician: Christy Abdi MD    Visit Date: 12/28/2020  Physician Orders: PT Eval and Treat Scapular dyskinesia, Scapular stabilization - Jeanette protocol, 1-3x/week x 6-8 weeks with HEP, ROM  Medical Diagnosis: M75.00 (ICD-10-CM) - Adhesive capsulitis of shoulder, unspecified laterality  Evaluation Date: 12/18/2020  Authorization Period Expiration: 12/31/2020  Plan of Care Certification Period: 2/12/2021  Visit # / Visits authorized: 4/ 10 (based on medical necessity)     Time In: 9:39 AM   Time Out: 10:23 AM  Total Billable Time: 35 minutes     Precautions: Standard    Subjective     Pt reports: she woke up Saturday morning with increased pain and tightness. spent Saturday and Sunday in bed. States she is very tight and painful this morning.  She was compliant with home exercise program. States she  Did spend 2 nights cooking and cleaning prior to her increased pain.  Response to previous treatment: did great. Doing much better.  Functional change: no change    Pain: 5/10  Location: right upper extremity    Objective     Mirian received therapeutic exercises to develop ROM, flexibility and posture for 19 minutes including:  UBE - 2/2  Following dry needling  +wall tacks x 20 reps  +R UE chicken wing 2 x 10 reps  + R UE standing protraction/retraction 2 x 10 reps  +B UE standing protraction/retraction 2 x 10 reps  scapular retractions 20 x 5  +robbery 20 reps    Add lawnmower pull next visit    Self first rib mobilization with strap - 15 deep breaths (R cervical side bend to slack UT) - reviewed  pec stretch on half foam x 5"  Shoulder protraction on half foam - x20   Doorway pec stretch (arms abducted to ~35 degrees)- 3x30''  Pulleys scaption - 3'   SL ER, Abd - x15 " Bedside and Verbal shift change report given to self (oncoming nurse) by Rk Pratt RN (offgoing nurse). Report included the following information SBAR, Kardex, MAR and Recent Results. Bilateral groin sites assessed with off going RN.   Standing Rows OTB - 3x10   Standing IR/ER OTB - 2x10       Mirian received the following manual therapy techniques: dry needling were applied to the: neck and R shoulder for 20 minutes, including:    15 application of TDN: Pt educated on benefits and potential side effects of dry needling. Educated pt on benefits, precautions, side effects following TDN. Educated pt to use heat following treatment sessions if pt is experiencing pain or soreness. Pt verbalized good understanding of education.  Pt signed written consent to dry needling. Pt gave verbal consent for DN    Pt received dry needling to the below listed muscles using 40 mm 60 mm needles.  R sub acromion bursa (60 mm pecking)  R biceps (40 mm pecking)  R upper trap  R levator scapula  R rhomboids    Winding performed every 5 minutes during treatment.      Home Exercises Provided and Patient Education Provided      Education provided:   - Discussed the use of heat tonight for pain reduction        Written Home Exercises Provided: yes.  Exercises were reviewed and Mirian was able to demonstrate them prior to the end of the session.  Mirian demonstrated good  understanding of the education provided.      See EMR under Patient Instructions for exercises provided 12/18/2020.    Assessment     Pt arrived to clinic with increased pain and R forward shoulder. Good soft tissue response to dry needling evident by increased grasp with unilateral winding at all insertion points. Winding performed every 5 minutes during treatment. No adverse effects following treatment. Will benefit from continued postural reeducation and scapular stabilization.      Mirian is progressing well towards her goals.   Pt prognosis is Good.     Pt will continue to benefit from skilled outpatient physical therapy to address the deficits listed in the problem list box on initial evaluation, provide pt/family education and to maximize pt's level of independence in the home and community  environment.     Pt's spiritual, cultural and educational needs considered and pt agreeable to plan of care and goals.    Anticipated barriers to physical therapy: none    Goals:  Short Term Goals: 4 weeks   1. Independent with HEP.  2. Report decreased R UE pain < or =  5/10 with adls such as  lifting, dressing, taking off shirt,applying deodorant, reaching behind her back, and fastening her bra.  3. Increased MMT for B UE by 1 muscle grade to promote proper scapular stability to decrease R UE pain < or =  5/10 with adls such as  lifting, dressing, taking off shirt,applying deodorant, reaching behind her back, and fastening her bra.   4. Increased AROM R shoulder flexion to 160 deg, R shoulder abd 140 deg        Long Term Goals: 8 weeks   5. Increased R shoulder AROM abduction to 160 deg.  6. Report decreased R UE pain < or = 3/10 with adls such as lifting, dressing, taking off shirt,applying deodorant, reaching behind her back, and fastening her bra.   7. Increased MMT for B UE to 5/5 muscle grade to promote proper scapular stability to decrease R UE pain < or = 3/10 with adls such as lifting, dressing, taking off shirt,applying deodorant, reaching behind her back, and fastening her bra.    8. Patient to achieve CJ (at least 20% < 40% impaired, limited or restricted) level on the FOTO Outcomes Measurement System.    Plan     Certification Period/Plan of care expiration: 12/18/2020 to 2/12/2021.    Progress mobility and initiate shoulder strengthening next visit.    Reno Borrero, PT

## 2021-01-07 PROBLEM — I25.9 ISCHEMIC HEART DISEASE: Status: ACTIVE | Noted: 2021-01-07

## 2021-01-07 PROBLEM — E11.65 TYPE 2 DIABETES MELLITUS WITH HYPERGLYCEMIA, WITHOUT LONG-TERM CURRENT USE OF INSULIN (HCC): Status: ACTIVE | Noted: 2018-03-12

## 2021-09-22 ENCOUNTER — HOSPITAL ENCOUNTER (OUTPATIENT)
Dept: LAB | Age: 61
Discharge: HOME | End: 2021-09-22

## 2021-09-22 PROCEDURE — 88305 TISSUE EXAM BY PATHOLOGIST: CPT

## 2021-09-22 PROCEDURE — 88312 SPECIAL STAINS GROUP 1: CPT

## 2021-10-12 ENCOUNTER — TRANSCRIBE ORDER (OUTPATIENT)
Dept: SCHEDULING | Age: 61
End: 2021-10-12

## 2021-10-12 DIAGNOSIS — K74.60 UNSPECIFIED CIRRHOSIS OF LIVER (HCC): Primary | ICD-10-CM

## 2021-10-21 ENCOUNTER — HOSPITAL ENCOUNTER (OUTPATIENT)
Dept: CT IMAGING | Age: 61
Discharge: HOME | End: 2021-10-21
Attending: INTERNAL MEDICINE
Payer: COMMERCIAL

## 2021-10-21 DIAGNOSIS — K74.60 UNSPECIFIED CIRRHOSIS OF LIVER (HCC): ICD-10-CM

## 2021-10-21 LAB — CREAT BLD-MCNC: 0.72 MG/DL (ref 0.8–1.5)

## 2021-10-21 PROCEDURE — 74011000258 HC RX REV CODE- 258: Performed by: INTERNAL MEDICINE

## 2021-10-21 PROCEDURE — 82565 ASSAY OF CREATININE: CPT

## 2021-10-21 PROCEDURE — 74011000636 HC RX REV CODE- 636: Performed by: INTERNAL MEDICINE

## 2021-10-21 PROCEDURE — 74177 CT ABD & PELVIS W/CONTRAST: CPT

## 2021-10-21 RX ORDER — SODIUM CHLORIDE 0.9 % (FLUSH) 0.9 %
10 SYRINGE (ML) INJECTION
Status: COMPLETED | OUTPATIENT
Start: 2021-10-21 | End: 2021-10-21

## 2021-10-21 RX ADMIN — SODIUM CHLORIDE 100 ML: 900 INJECTION, SOLUTION INTRAVENOUS at 09:15

## 2021-10-21 RX ADMIN — Medication 10 ML: at 09:15

## 2021-10-21 RX ADMIN — IOPAMIDOL 100 ML: 755 INJECTION, SOLUTION INTRAVENOUS at 09:15

## 2021-10-21 RX ADMIN — DIATRIZOATE MEGLUMINE AND DIATRIZOATE SODIUM 15 ML: 660; 100 LIQUID ORAL; RECTAL at 09:16

## 2022-01-13 NOTE — PROGRESS NOTES
Patient pre-assessment complete for Jacobo kim with pt scheduled for 22 at 0800am, arrival time 0600am. Patient verified using . Patient instructed to bring all home medications in labeled bottles on the day of procedure. NPO status reinforced. Patient informed to take aspirin, bp meds and plavix on the day of procedure. Patient instructed to HOLD metformin and insulin starting today. Instructed they can take all other medications excluding vitamins & supplements.  Patient verbalizes understanding of all instructions & denies any questions at this time.  [facility]

## 2022-01-14 ENCOUNTER — HOSPITAL ENCOUNTER (OUTPATIENT)
Age: 62
Setting detail: OUTPATIENT SURGERY
Discharge: HOME | End: 2022-01-14
Attending: INTERNAL MEDICINE | Admitting: INTERNAL MEDICINE
Payer: COMMERCIAL

## 2022-01-14 VITALS
HEART RATE: 77 BPM | DIASTOLIC BLOOD PRESSURE: 76 MMHG | BODY MASS INDEX: 32.91 KG/M2 | SYSTOLIC BLOOD PRESSURE: 148 MMHG | WEIGHT: 243 LBS | OXYGEN SATURATION: 94 % | HEIGHT: 72 IN

## 2022-01-14 DIAGNOSIS — R07.2 PRECORDIAL CHEST PAIN: ICD-10-CM

## 2022-01-14 LAB
ACT BLD: 327 SECS (ref 70–128)
ANION GAP SERPL CALC-SCNC: 7 MMOL/L (ref 7–16)
ATRIAL RATE: 66 BPM
BUN SERPL-MCNC: 23 MG/DL (ref 8–23)
CALCIUM SERPL-MCNC: 9.3 MG/DL (ref 8.3–10.4)
CALCULATED P AXIS, ECG09: 45 DEGREES
CALCULATED R AXIS, ECG10: 3 DEGREES
CALCULATED T AXIS, ECG11: 87 DEGREES
CHLORIDE SERPL-SCNC: 106 MMOL/L (ref 98–107)
CO2 SERPL-SCNC: 25 MMOL/L (ref 21–32)
CREAT SERPL-MCNC: 0.85 MG/DL (ref 0.8–1.5)
DIAGNOSIS, 93000: NORMAL
ERYTHROCYTE [DISTWIDTH] IN BLOOD BY AUTOMATED COUNT: 13.3 % (ref 11.9–14.6)
GLUCOSE SERPL-MCNC: 291 MG/DL (ref 65–100)
HCT VFR BLD AUTO: 49.6 % (ref 41.1–50.3)
HGB BLD-MCNC: 15.9 G/DL (ref 13.6–17.2)
MAGNESIUM SERPL-MCNC: 3 MG/DL (ref 1.8–2.4)
MCH RBC QN AUTO: 27.7 PG (ref 26.1–32.9)
MCHC RBC AUTO-ENTMCNC: 32.1 G/DL (ref 31.4–35)
MCV RBC AUTO: 86.4 FL (ref 79.6–97.8)
NRBC # BLD: 0 K/UL (ref 0–0.2)
P-R INTERVAL, ECG05: 164 MS
PLATELET # BLD AUTO: 179 K/UL (ref 150–450)
PMV BLD AUTO: 9.8 FL (ref 9.4–12.3)
POTASSIUM SERPL-SCNC: 3.9 MMOL/L (ref 3.5–5.1)
Q-T INTERVAL, ECG07: 402 MS
QRS DURATION, ECG06: 102 MS
QTC CALCULATION (BEZET), ECG08: 421 MS
RBC # BLD AUTO: 5.74 M/UL (ref 4.23–5.6)
SODIUM SERPL-SCNC: 138 MMOL/L (ref 136–145)
VENTRICULAR RATE, ECG03: 66 BPM
WBC # BLD AUTO: 5.1 K/UL (ref 4.3–11.1)

## 2022-01-14 PROCEDURE — 74011000636 HC RX REV CODE- 636: Performed by: INTERNAL MEDICINE

## 2022-01-14 PROCEDURE — 74011000250 HC RX REV CODE- 250: Performed by: INTERNAL MEDICINE

## 2022-01-14 PROCEDURE — 83735 ASSAY OF MAGNESIUM: CPT

## 2022-01-14 PROCEDURE — C1894 INTRO/SHEATH, NON-LASER: HCPCS | Performed by: INTERNAL MEDICINE

## 2022-01-14 PROCEDURE — 77030029997 HC DEV COM RDL R BND TELE -B: Performed by: INTERNAL MEDICINE

## 2022-01-14 PROCEDURE — 85347 COAGULATION TIME ACTIVATED: CPT

## 2022-01-14 PROCEDURE — 93005 ELECTROCARDIOGRAM TRACING: CPT | Performed by: INTERNAL MEDICINE

## 2022-01-14 PROCEDURE — 77030004558 HC CATH ANGI DX SUPR TORQ CARD -A: Performed by: INTERNAL MEDICINE

## 2022-01-14 PROCEDURE — C1725 CATH, TRANSLUMIN NON-LASER: HCPCS | Performed by: INTERNAL MEDICINE

## 2022-01-14 PROCEDURE — 93458 L HRT ARTERY/VENTRICLE ANGIO: CPT | Performed by: INTERNAL MEDICINE

## 2022-01-14 PROCEDURE — 92928 PRQ TCAT PLMT NTRAC ST 1 LES: CPT | Performed by: INTERNAL MEDICINE

## 2022-01-14 PROCEDURE — C1874 STENT, COATED/COV W/DEL SYS: HCPCS | Performed by: INTERNAL MEDICINE

## 2022-01-14 PROCEDURE — 99152 MOD SED SAME PHYS/QHP 5/>YRS: CPT | Performed by: INTERNAL MEDICINE

## 2022-01-14 PROCEDURE — 74011250637 HC RX REV CODE- 250/637: Performed by: INTERNAL MEDICINE

## 2022-01-14 PROCEDURE — 99153 MOD SED SAME PHYS/QHP EA: CPT | Performed by: INTERNAL MEDICINE

## 2022-01-14 PROCEDURE — 77030012468 HC VLV BLEEDBK CNTRL ABBT -B: Performed by: INTERNAL MEDICINE

## 2022-01-14 PROCEDURE — C1769 GUIDE WIRE: HCPCS | Performed by: INTERNAL MEDICINE

## 2022-01-14 PROCEDURE — C1887 CATHETER, GUIDING: HCPCS | Performed by: INTERNAL MEDICINE

## 2022-01-14 PROCEDURE — 92978 ENDOLUMINL IVUS OCT C 1ST: CPT | Performed by: INTERNAL MEDICINE

## 2022-01-14 PROCEDURE — 74011250636 HC RX REV CODE- 250/636: Performed by: INTERNAL MEDICINE

## 2022-01-14 PROCEDURE — 85027 COMPLETE CBC AUTOMATED: CPT

## 2022-01-14 PROCEDURE — 80048 BASIC METABOLIC PNL TOTAL CA: CPT

## 2022-01-14 PROCEDURE — C1753 CATH, INTRAVAS ULTRASOUND: HCPCS | Performed by: INTERNAL MEDICINE

## 2022-01-14 PROCEDURE — 77030013529 HC DEV PLLBK SLED BSC -B: Performed by: INTERNAL MEDICINE

## 2022-01-14 DEVICE — STENT RONYX35012UX RESOLUTE ONYX 3.50X12
Type: IMPLANTABLE DEVICE | Status: FUNCTIONAL
Brand: RESOLUTE ONYX™

## 2022-01-14 RX ORDER — LIDOCAINE HYDROCHLORIDE 10 MG/ML
INJECTION INFILTRATION; PERINEURAL AS NEEDED
Status: DISCONTINUED | OUTPATIENT
Start: 2022-01-14 | End: 2022-01-14 | Stop reason: HOSPADM

## 2022-01-14 RX ORDER — MAG HYDROX/ALUMINUM HYD/SIMETH 200-200-20
30 SUSPENSION, ORAL (FINAL DOSE FORM) ORAL AS NEEDED
Status: DISCONTINUED | OUTPATIENT
Start: 2022-01-14 | End: 2022-01-14 | Stop reason: HOSPADM

## 2022-01-14 RX ORDER — HEPARIN SODIUM 10000 [USP'U]/ML
INJECTION, SOLUTION INTRAVENOUS; SUBCUTANEOUS AS NEEDED
Status: DISCONTINUED | OUTPATIENT
Start: 2022-01-14 | End: 2022-01-14 | Stop reason: HOSPADM

## 2022-01-14 RX ORDER — SODIUM CHLORIDE 9 MG/ML
75 INJECTION, SOLUTION INTRAVENOUS CONTINUOUS
Status: DISCONTINUED | OUTPATIENT
Start: 2022-01-14 | End: 2022-01-14 | Stop reason: HOSPADM

## 2022-01-14 RX ORDER — GUAIFENESIN 100 MG/5ML
324 LIQUID (ML) ORAL ONCE
Status: DISCONTINUED | OUTPATIENT
Start: 2022-01-14 | End: 2022-01-14 | Stop reason: HOSPADM

## 2022-01-14 RX ORDER — MIDAZOLAM HYDROCHLORIDE 1 MG/ML
INJECTION, SOLUTION INTRAMUSCULAR; INTRAVENOUS AS NEEDED
Status: DISCONTINUED | OUTPATIENT
Start: 2022-01-14 | End: 2022-01-14 | Stop reason: HOSPADM

## 2022-01-14 RX ORDER — CLOPIDOGREL BISULFATE 75 MG/1
TABLET ORAL AS NEEDED
Status: DISCONTINUED | OUTPATIENT
Start: 2022-01-14 | End: 2022-01-14 | Stop reason: HOSPADM

## 2022-01-14 RX ORDER — CLOPIDOGREL BISULFATE 75 MG/1
75 TABLET ORAL ONCE
Status: COMPLETED | OUTPATIENT
Start: 2022-01-14 | End: 2022-01-14

## 2022-01-14 RX ORDER — HYDROMORPHONE HYDROCHLORIDE 2 MG/ML
INJECTION, SOLUTION INTRAMUSCULAR; INTRAVENOUS; SUBCUTANEOUS AS NEEDED
Status: DISCONTINUED | OUTPATIENT
Start: 2022-01-14 | End: 2022-01-14 | Stop reason: HOSPADM

## 2022-01-14 RX ADMIN — ALUMINUM HYDROXIDE, MAGNESIUM HYDROXIDE, DIMETHICONE 30 ML: 200; 200; 20 LIQUID ORAL at 10:17

## 2022-01-14 RX ADMIN — CLOPIDOGREL BISULFATE 75 MG: 75 TABLET ORAL at 07:12

## 2022-01-14 RX ADMIN — SODIUM CHLORIDE 75 ML/HR: 900 INJECTION, SOLUTION INTRAVENOUS at 07:14

## 2022-01-14 NOTE — PROGRESS NOTES
Discharge Same Day as PCI Teaching    Discharge teaching completed. Patient instructed on right radial site care, including symptoms to report to MD, medication changes & Follow up Care to include:    1- Patient is being treated with 2 separate anti-platelet medications including aspirin 81mg & plavix 75mg. Continue to take as previously instructed. Patient instructed on the importance of these medications & that these medications must not be stopped for any reason or without talking to the doctor first.  2-  Patient is also being discharged on a medication for cholesterol management (ie-statin) pravastatin 40mg and instructed on the importance of continuing this medication as well. 3- Patient received a referral for [facility], contact information was faxed to Cardiac rehab & patient given telephone number to contact ([phone]) Card[facility] Rehab if they have not heard from them within 10 days.

## 2022-01-14 NOTE — Clinical Note
Contrast Dose Calculator:   Patient's age: 64.   Patient's sex: Male. Patient weight (kg) = 110.2. Creatinine level (mg/dL) = 0.85. Creatinine clearance (mL/min): 142.25. Contrast concentration (mg/mL) = 370. MACD = 300 mL. Max Contrast dose per Creatinine Cl calculator = 320.07 mL.

## 2022-01-14 NOTE — PROGRESS NOTES
Patient received to [address] [place] [address] room # 12  Ambulatory from [facility]. Patient scheduled for [facility] today with Dr Sid Damian. Procedure reviewed & questions answered, voiced good understanding consent obtained & placed on chart. All medications and medical history reviewed. Will prep patient per orders. Patient & family updated on plan of care. The patient has a fraility score of 3-MANAGING WELL, based on patient AT&Ox3, patient able to ambulate to room without difficulty.     Patient took aspirin 324mg at 0500 prior to arrival.

## 2022-01-14 NOTE — PROGRESS NOTES
TRANSFER - OUT REPORT:    R radial C with Dr Maverick Downing 8 mg  Dilaudid 1 mg  Heparin 12,000 units total  Plavix 300 mg  Stent to the prox circ  TR band 11 ml  No bleeding or hematoma noted at site. Site soft    Verbal report given to Inga(name) on Henrik Galaviz  being transferred to CPRU(unit) for routine progression of care       Report consisted of patients Situation, Background, Assessment and   Recommendations(SBAR). Information from the following report(s) Procedure Summary and MAR was reviewed with the receiving nurse. Lines:   Peripheral IV 01/14/22 Posterior;Right Hand (Active)       Peripheral IV 01/14/22 Distal;Left;Posterior Forearm (Active)        Opportunity for questions and clarification was provided.       Patient transported with:   Registered Nurse

## 2022-01-14 NOTE — DISCHARGE INSTRUCTIONS
POST PCI/STENT DISCHARGE INSTRUCTIONS    1. Check puncture site frequently for swelling or bleeding. If there is any bleeding, lie down and apply pressure over the area with a clean towel or washcloth and call 911. Notify your doctor for any redness, swelling, drainage, or oozing from the puncture site. Notify your doctor for any fever or chills. 2. If the extremity becomes cold, numb, or painful call [facility] at [phone].    3. Activity should be limited for the next 48-72 hours. No heavy lifting (anything over 10 pounds) for 3 days. No pushing or pulling with right wrist for the next three days. 4.  You may resume your usual diet. Drink more fluids than usual.    5.  Have a responsible person drive you home and stay with you for at least 24 hours after your heart catheterization/angiography. No driving for 24 hours. 6. You may remove bandage from your right wrist in 24 hours. You may shower in 24 hours. No tub baths, hot tubs, or swimming for 1 week. Do not place any lotions, creams, powders, or ointments over puncture site for 1 week. You may place a clean band-aid over the puncture site each day for 5 days. Change daily. YOU ARE BEING DISCHARGED ON TWO ANTI-PLATELET MEDICATIONS    1- aspirin 81mg    2- plavix 75mg    THESE MEDICATIONS  ARE VERY IMPORTANT TO YOUR RECOVERY AND  MUST BE TAKEN EXACTLY AS PRESCRIBED & NOT STOPPED FOR ANY REASON. THESE MAY ONLY BE STOPPED WITH AN ORDER FROM YOUR CARDIOLOGIST. CONTINUE TAKING AS PRESCRIBED    YOU ARE ALSO BEING DISCHARGED ON A MEDICATION FOR CHOLESTEROL MANAGEMENT. 1- PRAVASTATIN 40MG      You have also been referred to [facility]. Someone from Cardiac Rehab will be calling you to set up a follow up appointment. If they have not called you within a week,  please call [phone]. Sedation for a Medical Procedure: Care Instructions     You were given a sedative medication during your visit.  While many of the effects will have worn   off before you leave; you may continue to feel some effects for several hours. Common side effects from sedation include:  · Feeling sleepy. (Your doctors and nurses will make sure you are not too sleepy to go home.)  · Nausea and vomiting. This usually does not last long. · Feeling tired. How can you care for yourself at home? Activity    · Don't do anything for 24 hours that requires attention to detail. It takes time for the medicine effects to completely wear off. · Do not make important legal decisions for 24 hours. · Do not sign any legal documents for 24 hours. · Do not drink alcohol today     · For your safety, you should not drive or operate heavy machinery for the remainder of the day     · Rest when you feel tired. Getting enough sleep will help you recover. Diet    · You can eat your normal diet, unless your doctor gives you other instructions. If your stomach is upset, try clear liquids and bland, low-fat foods like plain toast or rice. · Drink plenty of fluids (unless your doctor tells you not to). · Don't drink alcohol for 24 hours. Medicines    · Be safe with medicines. Read and follow all instructions on the label. · If the doctor gave you a prescription medicine for pain, take it as prescribed. · If you are not taking a prescription pain medicine, ask your doctor if you can take an over-the-counter medicine. · If you think your pain medicine is making you sick to your stomach:  · Take your medicine after meals (unless your doctor has told you not to). · Ask your doctor for a different pain medicine. Percutaneous Coronary Intervention: What to Expect at [facility]     Percutaneous coronary intervention (PCI) is the name for procedures that are used to open a narrowed or blocked coronary artery. The two most common PCI procedures are coronary angioplasty and coronary stent placement.   Your groin or arm may have a bruise and feel sore for a day or two after a percutaneous coronary intervention (PCI). You can do light activities around the house, but nothing strenuous for several days. This care sheet gives you a general idea about how long it will take for you to recover. But each person recovers at a different pace. Follow the steps below to get better as quickly as possible. How can you care for yourself at home? Activity  · Do not do strenuous exercise and do not lift, pull, or push anything heavy until your doctor says it is okay. This may be for a day or two. You can walk around the house and do light activity, such as cooking. · You may shower 24 to 48 hours after the procedure, if your doctor okays it. Pat the incision dry. Do not take a bath for 1 week, or until your doctor tells you it is okay. · If the catheter was placed in your groin, try not to walk up stairs for the first couple of days. · If the catheter was placed in your arm near your wrist, do not bend your wrist deeply for the first couple of days. Be careful using your hand to get into and out of a chair or bed. · If your doctor recommends it, get more exercise. Walking is a good choice. Bit by bit, increase the amount you walk every day. Try for at least 30 minutes on most days of the week. Diet  · Drink plenty of fluids to help your body flush out the dye. If you have kidney, heart, or liver disease and have to limit fluids, talk with your doctor before you increase the amount of fluids you drink. · Keep eating a heart-healthy diet that has lots of fruits, vegetables, and whole grains. If you have not been eating this way, talk to your doctor. You also may want to talk to a dietitian. This expert can help you to learn about healthy foods and plan meals. Medicines  · Your doctor will tell you if and when you can restart your medicines. He or she will also give you instructions about taking any new medicines.   · If you take blood thinners, such as warfarin (Coumadin), clopidogrel (Plavix), or aspirin, be sure to talk to your doctor. He or she will tell you if and when to start taking those medicines again. Make sure that you understand exactly what your doctor wants you to do. · Your doctor will prescribe blood-thinning medicines. You will likely take aspirin plus another antiplatelet, such as clopidogrel (Plavix). It is very important that you take these medicines exactly as directed. These medicines help keep the coronary artery open and reduce your risk of a heart attack. · Call your doctor if you think you are having a problem with your medicine. Care of the catheter site  · For 1 or 2 days, keep a bandage over the spot where the catheter was inserted. The bandage probably will fall off in this time. · Put ice or a cold pack on the area for 10 to 20 minutes at a time to help with soreness or swelling. Put a thin cloth between the ice and your skin. Follow-up care is a key part of your treatment and safety. Be sure to make and go to all appointments, and call your doctor if you are having problems. It's also a good idea to know your test results and keep a list of the medicines you take. When should you call for help? Call 911 anytime you think you may need emergency care. For example, call if:  · You passed out (lost consciousness). · You have severe trouble breathing. · You have sudden chest pain and shortness of breath, or you cough up blood. · You have symptoms of a heart attack, such as:  ¨ Chest pain or pressure. ¨ Sweating. ¨ Shortness of breath. ¨ Nausea or vomiting. ¨ Pain that spreads from the chest to the neck, jaw, or one or both shoulders or arms. ¨ Dizziness or lightheadedness. ¨ A fast or uneven pulse. After calling 911, chew 1 adult-strength aspirin. Wait for an ambulance. Do not try to drive yourself.   · You have been diagnosed with angina, and you have angina symptoms that do not go away with rest or are not getting better within 5 minutes after you take one dose of nitroglycerin. Call your doctor now or seek immediate medical care if:  · You are bleeding from the area where the catheter was put in your artery. · You have a fast-growing, painful lump at the catheter site. · You have signs of infection, such as:  ¨ Increased pain, swelling, warmth, or redness. ¨ Red streaks leading from the catheter site. ¨ Pus draining from the catheter site. ¨ A fever. · Your leg or arm looks blue or feels cold, numb, or tingly. Watch closely for changes in your health, and be sure to contact your doctor if you have any problems. Where can you learn more? Go to Since1910.com.be  Enter Y575 in the search box to learn more about \"Percutaneous Coronary Intervention: What to Expect at Home. \"   © 0323-8657 [employer], [employer]. Care instructions adapted under license by [employer] (which disclaims liability or warranty for this information). This care instruction is for use with your licensed healthcare professional. If you have questions about a medical condition or this instruction, always ask your healthcare professional. [place] [address] any warranty or liability for your use of this information. Content Version: 97.2.045363; Current as of: May 22, 2015         Cardiac Rehabilitation: Care Instructions  Your Care Instructions    Cardiac rehabilitation is a program for people who have a heart problem, such as a heart attack, heart failure, or a heart valve disease. The program includes exercise, lifestyle changes, education, and emotional support. Cardiac rehab can help you improve the quality of your life through better overall health. It can help you lose weight and feel better about yourself. On your cardiac rehab team, you may have your doctor, a nurse specialist, a dietitian, and a physical therapist. They will design your cardiac rehab program specifically for you.  You will learn how to reduce your risk for heart problems, how to manage stress, and how to eat a heart-healthy diet. By the end of the program, you will be ready to maintain a healthier lifestyle on your own. Follow-up care is a key part of your treatment and safety. Be sure to make and go to all appointments, and call your doctor if you are having problems. It's also a good idea to know your test results and keep a list of the medicines you take. How can you care for yourself at home? · Take your medicines exactly as prescribed. Call your doctor if you think you are having a problem with your medicine. You will get more details on the specific medicines your doctor prescribes. · Weigh yourself every day if your doctor tells you to. Watch for sudden weight gain. Weigh yourself on the same scale with the same amount of clothing at the same time of day. · Plan your meals so that you are eating heart-healthy foods. ¨ Eat a variety of foods daily. Fresh fruits and vegetables and whole-grains are good choices. ¨ Limit your fat intake, especially saturated and trans fat. ¨ Limit salt (sodium). ¨ Increase fiber in your diet. ¨ Limit alcohol. · Learn how to take your pulse so that you can track your heart rate during exercise. · Always check with your doctor before you begin a new exercise program.  · Warm up before you exercise and cool down afterward for at least 15 minutes each. This will help your heart gradually prepare for and recover from exercise and avoid pushing your heart too hard. · Stop exercising if you have any unusual discomfort, such as chest pain. · Do not smoke. Smoking can make heart problems worse. If you need help quitting, talk to your doctor about stop-smoking programs and medicines. These can increase your chances of quitting for good. When should you call for help? Call 911 anytime you think you may need emergency care. For example, call if:  · You have severe trouble breathing.   · You cough up pink, foamy mucus and you have trouble breathing. · You have symptoms of a heart attack. These may include:  ¨ Chest pain or pressure, or a strange feeling in the chest.  ¨ Sweating. ¨ Shortness of breath. ¨ Nausea or vomiting. ¨ Pain, pressure, or a strange feeling in the back, neck, jaw, or upper belly or in one or both shoulders or arms. ¨ Lightheadedness or sudden weakness. ¨ A fast or irregular heartbeat. After you call 911, the  may tell you to chew 1 adult-strength or 2 to 4 low-dose aspirin. Wait for an ambulance. Do not try to drive yourself. · You have angina symptoms (such as chest pain or pressure) that do not go away with rest or are not getting better within 5 minutes after you take a dose of nitroglycerin. · You have symptoms of a stroke. These may include:  ¨ Sudden numbness, tingling, weakness, or loss of movement in your face, arm, or leg, especially on only one side of your body. ¨ Sudden vision changes. ¨ Sudden trouble speaking. ¨ Sudden confusion or trouble understanding simple statements. ¨ Sudden problems with walking or balance. ¨ A sudden, severe headache that is different from past headaches. · You passed out (lost consciousness). Call your doctor now or seek immediate medical care if:  · You have new or increased shortness of breath. · You are dizzy or lightheaded, or you feel like you may faint. · You gain weight suddenly, such as 3 pounds or more in 2 to 3 days. (Your doctor may suggest a different range of weight gain.)  · You have increased swelling in your legs, ankles, or feet. Watch closely for changes in your health, and be sure to contact your doctor if you have any problems. Where can you learn more? Go to http://www.gray.com/. Enter C703 in the search box to learn more about \"Cardiac Rehabilitation: Care Instructions. \"  Current as of: May 5, 2016  Content Version: 11.1  © 8812-8898 [employer].  Care instructions adapted under license by [employer] (which disclaims liability or warranty for this information). If you have questions about a medical condition or this instruction, always ask your healthcare professional. [address] any warranty or liability for your use of this information. [employer] Activation    Thank you for requesting access to [employer]. Please follow the instructions below to securely access and download your online medical record. [employer] allows you to send messages to your doctor, view your test results, renew your prescriptions, schedule appointments, and more. How Do I Sign Up? 1. In your internet browser, go to [email or web address]. 2. Click on the First Time User? Click Here link in the Sign In box. You will see the New Member Sign Up page. 3. Enter your [employer] Access Code exactly as it appears below. You will not need to use this code after youve completed the sign-up process. If you do not sign up before the expiration date, you must request a new code. [employer] Access Code: Activation code not generated  Current [employer] Status: Active (This is the date your [employer] access code will )    4. Enter the last four digits of your Social Security Number (xxxx) and Date of Birth (mm/dd/yyyy) as indicated and click Submit. You will be taken to the next sign-up page. 5. Create a [employer] ID. This will be your [employer] login ID and cannot be changed, so think of one that is secure and easy to remember. 6. Create a [employer] password. You can change your password at any time. 7. Enter your Password Reset Question and Answer. This can be used at a later time if you forget your password. 8. Enter your e-mail address. You will receive e-mail notification when new information is available in [address] 9. Click Sign Up. You can now view and download portions of your medical record.   10. Click the Download Summary menu link to download a portable copy of your medical information. Additional Information    If you have questions, please visit the Frequently Asked Questions section of the [employer] website at [email or web address] Remember, [employer] is NOT to be used for urgent needs. For medical emergencies, dial 911.

## 2022-01-14 NOTE — DISCHARGE SUMMARY
[facility]iology Discharge Summary     Patient ID:  Mike Concepcion  [id number]  42 y.o.  1960    Admit date: 1/14/2022    Discharge date:  1/14/2022    Admitting Physician: Antoinette Farley MD     Discharge Physician: Dr. Danika Tiwari    Admission Diagnoses: Precordial chest pain [R07.2]    Discharge Diagnoses:    Diagnosis    CAD    Type 2 diabetes mellitus with hyperglycemia, without long-term current use of insulin ([address]    Precordial pain    Obesity    HTN (hypertension)    Dyslipidemia     Cardiology Procedures this admission:  Left heart catheterization with PCI  Consults: None    Hospital Course: Patient was seen at the office of [facility] by Dr. Danika Tiwari for complaints of chest pain on exertion and SOB and was subsequently scheduled for an AM Admission [facility] on 1/14/22. Patient underwent cardiac catheterization by Dr. Danika Tiwari. Patient was found to have significant stenosis of the LCx that was stented with a 3.5 x 12 mm Resolute Orestes with 0% residual stenosis. Patient tolerated the procedure well and was taken to the telemetry floor for recovery. The morning of discharge, patient was up feeling well without any complaints of chest pain or shortness of breath. Patient's right radial cath site was clean, dry and intact without hematoma or bruit. Patient's labs were WNL. Patient was seen and examined by Dr. Danika Tiwari and determined stable and ready for discharge. Patient was instructed on the importance of medication compliance including taking aspirin and Plavix everyday without missing a dose. After receiving drug eluting stents, the patient will remain on dual anti-platelet therapy for 1 year. For maximized medical therapy for CAD, patient will continue ACE-I and high intensity statin as well. The patient will follow up with [facility] -- Dr. Danika Tiwari on 1/31 at 9:15 am in [facility]. Patient has been referred to cardiac rehab.       DISPOSITION: The patient is being discharged home in stable condition on a low saturated fat, low cholesterol and low salt diet. The patient is instructed to advance activities as tolerated to the limit of fatigue or shortness of breath. The patient is instructed to avoid all heavy lifting, straining, stooping or squatting for 3-5 days. The patient is instructed to watch the cath site for bleeding/oozing; if seen, the patient is instructed to apply firm pressure with a clean cloth and call [facility] at [phone]. The patient is instructed to watch for signs of infection which include: increasing area of redness, fever/hot to touch or purulent drainage at the catheterization site. The patient is instructed not to soak in a bathtub for 7-10 days, but is cleared to shower. The patient is instructed to call the office or return to the ER for immediate evaluation for any shortness of breath or chest pain not relieved by NTG. Discharge Exam: Patient has been seen by Dr. Sam Putnam: see his progress note for exam details.     Visit Vitals  BP (!) 156/91   Pulse 68   Ht 6' (1.829 m)   Wt 110.2 kg (243 lb)   SpO2 95%   BMI 32.96 kg/m²       Recent Results (from the past 24 hour(s))   CBC W/O DIFF    Collection Time: 01/14/22  6:46 AM   Result Value Ref Range    WBC 5.1 4.3 - 11.1 K/uL    RBC 5.74 (H) 4.23 - 5.6 M/uL    HGB 15.9 13.6 - 17.2 g/dL    HCT 49.6 41.1 - 50.3 %    MCV 86.4 79.6 - 97.8 FL    MCH 27.7 26.1 - 32.9 PG    MCHC 32.1 31.4 - 35.0 g/dL    RDW 13.3 11.9 - 14.6 %    PLATELET 775 602 - 281 K/uL    MPV 9.8 9.4 - 12.3 FL    ABSOLUTE NRBC 0.00 0.0 - 0.2 K/uL   METABOLIC PANEL, BASIC    Collection Time: 01/14/22  6:46 AM   Result Value Ref Range    Sodium 138 136 - 145 mmol/L    Potassium 3.9 3.5 - 5.1 mmol/L    Chloride 106 98 - 107 mmol/L    CO2 25 21 - 32 mmol/L    Anion gap 7 7 - 16 mmol/L    Glucose 291 (H) 65 - 100 mg/dL    BUN 23 8 - 23 MG/DL    Creatinine 0.85 0.8 - 1.5 MG/DL    GFR est AA >60 >60 ml/min/1.73m2    GFR est non-AA >60 >60 ml/min/1.73m2    Calcium 9.3 8.3 - 10.4 MG/DL   MAGNESIUM    Collection Time: 01/14/22  6:46 AM   Result Value Ref Range    Magnesium 3.0 (H) 1.8 - 2.4 mg/dL   EKG, 12 LEAD, INITIAL    Collection Time: 01/14/22  7:34 AM   Result Value Ref Range    Ventricular Rate 66 BPM    Atrial Rate 66 BPM    P-R Interval 164 ms    QRS Duration 102 ms    Q-T Interval 402 ms    QTC Calculation (Bezet) 421 ms    Calculated P Axis 45 degrees    Calculated R Axis 3 degrees    Calculated T Axis 87 degrees    Diagnosis       Normal sinus rhythm  Nonspecific T wave abnormality  Abnormal ECG     POC ACTIVATED CLOTTING TIME    Collection Time: 01/14/22  8:48 AM   Result Value Ref Range    Activated Clotting Time (POC) 327 (H) 70 - 128 SECS         Patient Instructions:   Current Discharge Medication List      CONTINUE these medications which have NOT CHANGED    Details   insulin lispro (HumaLOG KwikPen Insulin) 200 unit/mL (3 mL) inpn 6-10 Units by SubCUTAneous route. dicyclomine (BENTYL) 10 mg capsule       colestipoL (Colestid) 1 gram tablet Take 1 g by mouth two (2) times a day. timoloL maleate 20 mg tab Take 20 mg by mouth two (2) times a day. Qty: 60 Tablet, Refills: 3      chlorthalidone (HYGROTON) 25 mg tablet Take 1 Tablet by mouth daily. Qty: 30 Tablet, Refills: 3      FreeStyle Lite Strips strip BY MISCELLANEOUS ROUTE 4 (FOUR) TIMES A DAY      ALPRAZolam (XANAX) 1 mg tablet Take 1 mg by mouth once. insulin glargine U-300 conc 300 unit/mL (3 mL) inpn 40-45 Units by SubCUTAneous route daily. magnesium oxide 500 mg tab Take  by mouth. cholecalciferol, vitamin D3, (Vitamin D3) 50 mcg (2,000 unit) tab Take  by mouth. amLODIPine (NORVASC) 10 mg tablet TAKE 1 TABLET BY MOUTH EVERY DAY      dapagliflozin (Farxiga) 10 mg tab tablet Take  by mouth daily. glimepiride (AMARYL) 4 mg tablet Take  by mouth every morning.       icosapent ethyL (VASCEPA) 1 gram capsule Take 2 Caps by mouth two (2) times daily (with meals). Qty: 360 Cap, Refills: 3      metFORMIN (GLUCOPHAGE) 500 mg tablet Take 1,000 mg by mouth two (2) times daily (with meals). traZODone (DESYREL) 50 mg tablet Take  by mouth nightly as needed for Sleep. tiZANidine (ZANAFLEX) 4 mg tablet Take 4 mg by mouth three (3) times daily as needed for Muscle Spasm(s). cyanocobalamin (VITAMIN B-12) 1,000 mcg tablet Take 1,000 mcg by mouth daily. clopidogrel (PLAVIX) 75 mg tab Take 1 Tab by mouth daily. Qty: 30 Tab, Refills: 3      folic acid (FOLVITE) 1 mg tablet Take 1 Tab by mouth daily. Qty: 90 Tab, Refills: 3    Associated Diagnoses: Neuropathy      benazepril (LOTENSIN) 40 mg tablet Take 1 Tab by mouth daily. Indications: hypertension  Qty: 90 Tab, Refills: 3    Associated Diagnoses: Acute angina (HCC)      pravastatin (PRAVACHOL) 40 mg tablet Take 1 Tab by mouth daily. Qty: 90 Tab, Refills: 3    Associated Diagnoses: Hyperlipidemia, unspecified hyperlipidemia type      cyanocobalamin (VITAMIN B12) 1,000 mcg/mL injection 1,000mcg by IM route monthly  Qty: 3 Vial, Refills: 3      ondansetron (ZOFRAN ODT) 4 mg disintegrating tablet Take 1 Tab by mouth every eight (8) hours as needed for Nausea. Qty: 90 Tab, Refills: 3      clotrimazole-betamethasone (LOTRISONE) topical cream 1 application 2 times daily  Qty: 30 g, Refills: 4      nitroglycerin (NITROSTAT) 0.4 mg SL tablet 1 Tab by SubLINGual route every five (5) minutes as needed for Chest Pain. Qty: 3 Bottle, Refills: 3    Associated Diagnoses: Coronary artery disease of native artery of native heart with stable angina pectoris (HCC)      HYDROcodone-acetaminophen (NORCO) 5-325 mg per tablet Take 1 Tab by mouth every eight (8) hours as needed for Pain. Max Daily Amount: 3 Tabs. Qty: 90 Tab, Refills: 0    Associated Diagnoses: Neuropathy; Arthritis      aspirin 81 mg chewable tablet Take 1 Tab by mouth daily.   Qty: 30 Tab, Refills: 11      multivitamin (ONE A DAY) tablet Take 1 Tab by mouth daily. fenofibrate (LOFIBRA) 160 mg tablet Take 1 Tab by mouth daily.   Qty: 90 Tab, Refills: 3    Associated Diagnoses: Hyperlipidemia, unspecified hyperlipidemia type               Signed:  STIVEN Mcclelland  1/14/2022  11:43 AM

## 2022-01-14 NOTE — PROGRESS NOTES
Report received from [address] [place] [address]. Procedural findings communicated. Intra procedural  medication administration reviewed. Progression of care discussed.      Patient received into CPRU [place] [address] post sheath removal.     right Radial access site without bleeding or swelling     TR band dry and intact     Patient instructed to limit movement to right upper extremity    Routine post procedural vital signs and site assessment initiated

## 2022-01-15 NOTE — PROGRESS NOTES
SAME DAY DISCHARGE FOLLOW UP PHONE CALL           NAME Aurelia Cassidy           : [id number]                    DATE/TIME:   22 (Jacquelyn Caicedo)                           MRN# [id number]        1. Ensure that the patient has had their new prescriptions filled & ask if they have taken their anti-platelet & aspirin that day. Pt will take asa & plavix tonight as taking prior to admission. Reinforced the importance of continuing both these medications & to not stop for any reason without discussing with the cardiologist first. Voiced good understanding    2. Ask about access site. Have the patient assess for any signs of a hematoma. Ask about any pain at access site. Right radial site doing well, denies any bleeding hematoma, swelling, pain or numbness      3. Ask the patient if they had experienced any chest pain or shortness of breath.     Reports feeling well, denies any chest pain or shortness of breath

## 2022-01-15 NOTE — CONSULTS
[facility] Cardiology Consult                Date of  Admission: 1/14/2022  5:52 AM         Jacobo Hodge is a 64 y.o. male admitted for cath for unstable angina. Prior hx cad and pci. He has had recent onset inc. Cp exertional occ at rest precordia lheavy c/w uap.     Patient Active Problem List   Diagnosis Code    Acute pancreatitis K85.90    Diabetes mellitus type II, controlled ([facility] E11.9    HTN (hypertension) I10    Dyslipidemia E78.5    Chronic diarrhea K52.9    Abnormal cardiovascular function study R94.30    CAD (coronary artery disease) I25.10    Elevated liver function tests R79.89    Balantidiasis A07.0    IBS (irritable bowel syndrome) K58.9    Neuropathy G62.9    Obesity E66.9    Sinusitis J32.9    GERD (gastroesophageal reflux disease) K21.9    Dyspepsia and disorder of function of stomach K31.9, U25.01    Nonalcoholic fatty liver disease K76.0    Abdominal pain R10.9    Anxiety F41.9    Type 2 diabetes mellitus with hyperglycemia, without long-term current use of insulin (HCC) E11.65    Precordial pain R07.2    Ischemic heart disease I25.9       Past Medical History:   Diagnosis Date    Abdominal pain     RLQ abdominal pain    Abnormal AST and ALT     Abnormal cardiovascular function study 5/2/2016    Anxiety 7/18/2016    Arthritis     Back pain     Back pain     Balantidiasis     Bilateral pain of leg and foot     Bronchitis     CAD (coronary artery disease)     2 blocked arteries    CAD S/P percutaneous coronary angioplasty     PCI to LCFx 2/9/09 NATIVE VESSEL      Cellulitis and abscess of left great toe     Chest pain     Chronic diarrhea 3/19/2013    Cough     Diabetes (HCC)     IDDM and oral:   -260      Diabetes mellitus type II, controlled ([facility] 3/19/2013    Diarrhea, functional     Dizziness and giddiness     Dyslipidemia 3/19/2013    Hyperlipidemia    Dyspepsia and disorder of function of stomach     Dyspnea     Elevated liver function tests     Excessive sweating     Fatigue     Fatty liver     Gastrointestinal disorder     pancreatitis 3/2013    GERD (gastroesophageal reflux disease)     daily med    Heart failure (HCC)     hx of    Hemorrhage of rectum and anus     HTN (hypertension) 3/19/2013    Hypertension     IBS (irritable bowel syndrome)     Keratosis, actinic     Knee pain     Liver disease     fatty liver    Metatarsalgia of right foot     Nausea & vomiting     needs antiemetic and will do fine    Neuropathy     Neutropenia (HCC)     Nonalcoholic fatty liver disease     Obesity     Other ill-defined conditions(799.89)     high cholesterol    Pain of left heel     Pancreatitis     Pharyngitis     Phlebitis after infusion     Plantar fasciitis of left foot     Proteinuria 12/4/14    Sinusitis     Thrombocytopenia (HCC)     Thromboembolus (HCC)     legs    Varicose veins     large varicose veins upper medial thigh      Past Surgical History:   Procedure Laterality Date    HX CHOLECYSTECTOMY      grzegorz    HX HEART CATHETERIZATION      stents x 1  many years ago    VASCULAR SURGERY PROCEDURE UNLIST      scooter lower extremity vein stripping     Allergies   Allergen Reactions    Meloxicam Diarrhea      Family History   Problem Relation Age of Onset    Other Mother         carotid endar.  Cancer Father         Colon, tubular adenoma    Other Father         AAA    Heart Disease Father         No current facility-administered medications for this encounter. Current Outpatient Medications   Medication Sig    insulin lispro (HumaLOG KwikPen Insulin) 200 unit/mL (3 mL) inpn 6-10 Units by SubCUTAneous route.  dicyclomine (BENTYL) 10 mg capsule     colestipoL (Colestid) 1 gram tablet Take 1 g by mouth two (2) times a day.  timoloL maleate 20 mg tab Take 20 mg by mouth two (2) times a day.  chlorthalidone (HYGROTON) 25 mg tablet Take 1 Tablet by mouth daily.     FreeStyle Lite Strips strip BY MISCELLANEOUS ROUTE 4 (FOUR) TIMES A DAY    ALPRAZolam (XANAX) 1 mg tablet Take 1 mg by mouth once.  insulin glargine U-300 conc 300 unit/mL (3 mL) inpn 40-45 Units by SubCUTAneous route daily.  magnesium oxide 500 mg tab Take  by mouth.  cholecalciferol, vitamin D3, (Vitamin D3) 50 mcg (2,000 unit) tab Take  by mouth.  amLODIPine (NORVASC) 10 mg tablet TAKE 1 TABLET BY MOUTH EVERY DAY    dapagliflozin (Farxiga) 10 mg tab tablet Take  by mouth daily.  glimepiride (AMARYL) 4 mg tablet Take  by mouth every morning.  icosapent ethyL (VASCEPA) 1 gram capsule Take 2 Caps by mouth two (2) times daily (with meals).  metFORMIN (GLUCOPHAGE) 500 mg tablet Take 1,000 mg by mouth two (2) times daily (with meals).  traZODone (DESYREL) 50 mg tablet Take  by mouth nightly as needed for Sleep.  tiZANidine (ZANAFLEX) 4 mg tablet Take 4 mg by mouth three (3) times daily as needed for Muscle Spasm(s).  cyanocobalamin (VITAMIN B-12) 1,000 mcg tablet Take 1,000 mcg by mouth daily.  clopidogrel (PLAVIX) 75 mg tab Take 1 Tab by mouth daily.  folic acid (FOLVITE) 1 mg tablet Take 1 Tab by mouth daily.  benazepril (LOTENSIN) 40 mg tablet Take 1 Tab by mouth daily. Indications: hypertension    pravastatin (PRAVACHOL) 40 mg tablet Take 1 Tab by mouth daily.  cyanocobalamin (VITAMIN B12) 1,000 mcg/mL injection 1,000mcg by IM route monthly    ondansetron (ZOFRAN ODT) 4 mg disintegrating tablet Take 1 Tab by mouth every eight (8) hours as needed for Nausea.  clotrimazole-betamethasone (LOTRISONE) topical cream 1 application 2 times daily (Patient taking differently: as needed. 1 application 2 times daily )    nitroglycerin (NITROSTAT) 0.4 mg SL tablet 1 Tab by SubLINGual route every five (5) minutes as needed for Chest Pain.  HYDROcodone-acetaminophen (NORCO) 5-325 mg per tablet Take 1 Tab by mouth every eight (8) hours as needed for Pain. Max Daily Amount: 3 Tabs.     aspirin 81 mg chewable tablet Take 1 Tab by mouth daily.  multivitamin (ONE A DAY) tablet Take 1 Tab by mouth daily.  fenofibrate (LOFIBRA) 160 mg tablet Take 1 Tab by mouth daily. Review of Systems   Constitutional: Negative for chills and weight loss. HENT: Negative for ear discharge and tinnitus. Eyes: Negative for double vision. Respiratory: Negative for cough and hemoptysis. Endocrine: Negative for polydipsia and polyphagia. Hematologic/Lymphatic: Negative for adenopathy and bleeding problem. Skin: Negative for rash. Musculoskeletal: Negative for joint swelling. Gastrointestinal: Negative for abdominal pain and hematochezia. Genitourinary: Negative for flank pain and hematuria. Neurological: Negative for seizures. Psychiatric/Behavioral: Negative for suicidal ideas. Physical Exam  Vitals:    01/14/22 1330 01/14/22 1345 01/14/22 1400 01/14/22 1415   BP:  (!) 142/64 135/65 (!) 148/76   Pulse: 75 72 74 77   SpO2: 95% 94% 94%    Weight:       Height:           Physical Exam:  General: Well Developed, Well Nourished, No Acute Distress  HEENT: pupils equal and round, no abnormalities noted  Neck: supple, no JVD, no carotid bruits  Heart: S1S2 with RRR without murmurs or gallops  Lungs: Clear throughout auscultation bilaterally without adventitious sounds  Abd: soft, nontender, nondistended, with good bowel sounds  Ext: warm, no edema, calves supple/nontender, pulses 2+ bilaterally  Skin: warm and dry  Psychiatric: Normal mood and affect  Neurologic: Alert and oriented X 3    Cardiographics    Labs:     Recent Labs     01/14/22  0646      K 3.9   MG 3.0*   BUN 23   CREA 0.85   *   WBC 5.1   HGB 15.9   HCT 49.6           Assessment/Plan:     Assessment:      Unstable angina    Cath today.       Que Grover MD

## 2022-03-18 PROBLEM — R07.2 PRECORDIAL PAIN: Status: ACTIVE | Noted: 2018-03-12

## 2022-03-19 PROBLEM — I25.9 ISCHEMIC HEART DISEASE: Status: ACTIVE | Noted: 2021-01-07

## 2022-03-19 PROBLEM — E11.65 TYPE 2 DIABETES MELLITUS WITH HYPERGLYCEMIA, WITHOUT LONG-TERM CURRENT USE OF INSULIN (HCC): Status: ACTIVE | Noted: 2018-03-12

## 2022-05-26 ENCOUNTER — OFFICE VISIT (OUTPATIENT)
Dept: CARDIOLOGY CLINIC | Age: 62
End: 2022-05-26
Payer: COMMERCIAL

## 2022-05-26 VITALS
BODY MASS INDEX: 33.67 KG/M2 | DIASTOLIC BLOOD PRESSURE: 70 MMHG | SYSTOLIC BLOOD PRESSURE: 120 MMHG | WEIGHT: 248.6 LBS | HEIGHT: 72 IN | HEART RATE: 60 BPM

## 2022-05-26 DIAGNOSIS — I10 PRIMARY HYPERTENSION: ICD-10-CM

## 2022-05-26 DIAGNOSIS — I25.9 ISCHEMIC HEART DISEASE: Primary | ICD-10-CM

## 2022-05-26 PROCEDURE — 99214 OFFICE O/P EST MOD 30 MIN: CPT | Performed by: INTERNAL MEDICINE

## 2022-05-26 RX ORDER — TIMOLOL MALEATE 20 MG/1
20 TABLET ORAL 2 TIMES DAILY
Qty: 180 TABLET | Refills: 3 | Status: SHIPPED | OUTPATIENT
Start: 2022-05-26

## 2022-05-26 NOTE — PROGRESS NOTES
[facility] CARDIOLOGY  [address], [address], [address] [place] [address]  PHONE: [phone]        22        NAME:  Hayden Roberts  : 1960  MRN: [id number]     CHIEF COMPLAINT:    Coronary Artery Disease (cath) and Hyperlipidemia         SUBJECTIVE:     Doing well. No cp or palp or dizziness. Diabetes better with new PCP. Medications were all reviewed with the patient today and updated as necessary. Current Outpatient Medications   Medication Sig    timolol (BLOCADREN) 20 MG tablet Take 1 tablet by mouth 2 times daily    amLODIPine (NORVASC) 10 MG tablet TAKE 1 TABLET BY MOUTH EVERY DAY    aspirin 81 MG chewable tablet Take 81 mg by mouth daily    benazepril (LOTENSIN) 40 MG tablet Take 40 mg by mouth daily    chlorthalidone (HYGROTON) 25 MG tablet Take 25 mg by mouth daily    Cholecalciferol 50 MCG (2000) TABS Take by mouth    clopidogrel (PLAVIX) 75 MG tablet Take 75 mg by mouth daily    clotrimazole-betamethasone (LOTRISONE) 8-5.62 % cream 1 application 2 times daily    colestipol (COLESTID) 1 g tablet Take 1 g by mouth 2 times daily    cyanocobalamin 1000 MCG tablet Take 1,000 mcg by mouth daily    cyanocobalamin 1000 MCG/ML injection 1,000mcg by IM route monthly    dapagliflozin (FARXIGA) 10 MG tablet Take by mouth daily    dicyclomine (BENTYL) 10 MG capsule Take 10 mg by mouth 4 times daily    fenofibrate (TRIGLIDE) 160 MG tablet Take 160 mg by mouth daily    folic acid (FOLVITE) 1 MG tablet Take 1 mg by mouth daily    HYDROcodone-acetaminophen (NORCO) 5-325 MG per tablet Take 1 tablet by mouth every 8 hours as needed.     Icosapent Ethyl (VASCEPA) 1 g CAPS capsule Take 2 capsules by mouth 2 times daily (with meals)    Insulin Glargine, 2 Unit Dial, 300 UNIT/ML SOPN Inject 40-45 Units into the skin daily    insulin lispro (HUMALOG KWIKPEN) 200 UNIT/ML SOPN pen Inject 6-10 Units into the skin    Magnesium Oxide 500 MG TABS Take by mouth    metFORMIN (GLUCOPHAGE) 500 MG tablet Take 1,000 mg by mouth 2 times daily (with meals)    nitroGLYCERIN (NITROSTAT) 0.4 MG SL tablet Place 0.4 mg under the tongue    ondansetron (ZOFRAN-ODT) 4 MG disintegrating tablet Take 4 mg by mouth every 8 hours as needed    pravastatin (PRAVACHOL) 40 MG tablet Take 40 mg by mouth daily    tiZANidine (ZANAFLEX) 4 MG tablet Take 4 mg by mouth 3 times daily as needed    traZODone (DESYREL) 50 MG tablet Take by mouth    glimepiride (AMARYL) 4 MG tablet Take by mouth (Patient not taking: Reported on 5/26/2022)     No current facility-administered medications for this visit. Allergies   Allergen Reactions    Meloxicam Diarrhea           PHYSICAL EXAM:     Wt Readings from Last 3 Encounters:   05/26/22 248 lb 9.6 oz (112.8 kg)   02/01/22 248 lb (112.5 kg)   11/01/21 224 lb (101.6 kg)     BP Readings from Last 3 Encounters:   05/26/22 120/70   02/01/22 (!) 149/75   11/01/21 (!) 140/70       /70   Pulse 60   Ht 6' (1.829 m)   Wt 248 lb 9.6 oz (112.8 kg)   BMI 33.72 kg/m²     Physical Exam  Vitals reviewed. HENT:      Head: Normocephalic and atraumatic. Eyes:      Extraocular Movements: Extraocular movements intact. Pupils: Pupils are equal, round, and reactive to light. Cardiovascular:      Rate and Rhythm: Normal rate. Heart sounds: Normal heart sounds. Pulmonary:      Effort: Pulmonary effort is normal.      Breath sounds: Normal breath sounds. Abdominal:      General: Abdomen is flat. Palpations: Abdomen is soft. There is no mass. Musculoskeletal:         General: Normal range of motion. Cervical back: Normal range of motion. Skin:     General: Skin is warm and dry. Neurological:      General: No focal deficit present. Mental Status: He is alert and oriented to person, place, and time. Psychiatric:         Mood and Affect: Mood normal.           RECENT LABS AND RECORDS REVIEW          ASSESSMENT and PLAN    1.  Ischemic heart disease         2. Type 2 diabetes mellitus with hyperglycemia, without long-term current use of insulin (HCC)         3. Primary hypertension     CV status is stable. Medications and most recent labs reviewed. Diet and exercise are encouraged. Greater  than 50% of today's visit was devoted to counseling the patient, explaining disease concepts and offering advice and suggestions for optimal care. There are no diagnoses linked to this encounter. Return in about 6 months (around 11/26/2022).        Mary Quijano MD  5/26/2022  10:55 AM

## 2022-08-09 ENCOUNTER — HOSPITAL ENCOUNTER (OUTPATIENT)
Dept: CT IMAGING | Age: 62
Discharge: HOME | End: 2022-08-12
Payer: COMMERCIAL

## 2022-08-09 DIAGNOSIS — K76.9 LIVER DISEASE: ICD-10-CM

## 2022-08-09 LAB — CREAT BLD-MCNC: 0.85 MG/DL (ref 0.8–1.5)

## 2022-08-09 PROCEDURE — 6360000004 HC RX CONTRAST MEDICATION: Performed by: INTERNAL MEDICINE

## 2022-08-09 PROCEDURE — 74160 CT ABDOMEN W/CONTRAST: CPT

## 2022-08-09 PROCEDURE — 2580000003 HC RX 258: Performed by: INTERNAL MEDICINE

## 2022-08-09 PROCEDURE — 82565 ASSAY OF CREATININE: CPT

## 2022-08-09 RX ORDER — 0.9 % SODIUM CHLORIDE 0.9 %
100 INTRAVENOUS SOLUTION INTRAVENOUS
Status: COMPLETED | OUTPATIENT
Start: 2022-08-09 | End: 2022-08-09

## 2022-08-09 RX ORDER — SODIUM CHLORIDE 0.9 % (FLUSH) 0.9 %
10 SYRINGE (ML) INJECTION
Status: COMPLETED | OUTPATIENT
Start: 2022-08-09 | End: 2022-08-09

## 2022-08-09 RX ADMIN — SODIUM CHLORIDE, PRESERVATIVE FREE 10 ML: 5 INJECTION INTRAVENOUS at 15:33

## 2022-08-09 RX ADMIN — IOPAMIDOL 100 ML: 755 INJECTION, SOLUTION INTRAVENOUS at 15:33

## 2022-08-09 RX ADMIN — SODIUM CHLORIDE 100 ML: 9 INJECTION, SOLUTION INTRAVENOUS at 15:33

## 2022-08-09 RX ADMIN — DIATRIZOATE MEGLUMINE AND DIATRIZOATE SODIUM 15 ML: 660; 100 LIQUID ORAL; RECTAL at 15:33

## 2022-08-11 ENCOUNTER — PREP FOR PROCEDURE (OUTPATIENT)
Dept: ADMINISTRATIVE | Age: 62
End: 2022-08-11

## 2022-08-11 RX ORDER — SODIUM CHLORIDE 0.9 % (FLUSH) 0.9 %
5-40 SYRINGE (ML) INJECTION PRN
OUTPATIENT
Start: 2022-08-11

## 2022-08-11 RX ORDER — SODIUM CHLORIDE 9 MG/ML
INJECTION, SOLUTION INTRAVENOUS PRN
OUTPATIENT
Start: 2022-08-11

## 2022-08-11 RX ORDER — SODIUM CHLORIDE 0.9 % (FLUSH) 0.9 %
5-40 SYRINGE (ML) INJECTION EVERY 12 HOURS SCHEDULED
OUTPATIENT
Start: 2022-08-11

## 2022-08-17 ENCOUNTER — ANESTHESIA EVENT (OUTPATIENT)
Dept: ENDOSCOPY | Age: 62
End: 2022-08-17
Payer: COMMERCIAL

## 2022-08-17 RX ORDER — SODIUM CHLORIDE 9 MG/ML
INJECTION, SOLUTION INTRAVENOUS PRN
Status: CANCELLED | OUTPATIENT
Start: 2022-08-17

## 2022-08-17 RX ORDER — SODIUM CHLORIDE, SODIUM LACTATE, POTASSIUM CHLORIDE, CALCIUM CHLORIDE 600; 310; 30; 20 MG/100ML; MG/100ML; MG/100ML; MG/100ML
INJECTION, SOLUTION INTRAVENOUS CONTINUOUS
Status: CANCELLED | OUTPATIENT
Start: 2022-08-17

## 2022-08-17 RX ORDER — SODIUM CHLORIDE 0.9 % (FLUSH) 0.9 %
5-40 SYRINGE (ML) INJECTION EVERY 12 HOURS SCHEDULED
Status: CANCELLED | OUTPATIENT
Start: 2022-08-17

## 2022-08-17 RX ORDER — SODIUM CHLORIDE 0.9 % (FLUSH) 0.9 %
5-40 SYRINGE (ML) INJECTION PRN
Status: CANCELLED | OUTPATIENT
Start: 2022-08-17

## 2022-08-17 RX ORDER — LIDOCAINE HYDROCHLORIDE 10 MG/ML
1 INJECTION, SOLUTION INFILTRATION; PERINEURAL
Status: CANCELLED | OUTPATIENT
Start: 2022-08-17 | End: 2022-08-17

## 2022-08-17 NOTE — PERIOP NOTE
Patient verified name, , and procedure. Type: 1a; abbreviated assessment per anesthesia guidelines  Labs per surgeon: none  Labs per anesthesia: POC glucose      Instructed pt that they will be notified by the Gi Lab for time of arrival. If any questions please call the GI lab at [phone]. Follow diet and prep instructions per office. May have clear liquids until 4 hours prior to time of arrival.    [place] Roxo or shower the night before and the am of surgery with antibacterial soap. No lotions, oils, powders, cologne on skin. No make up, eye make up or jewelry. Wear loose fitting comfortable, clean clothing. Must have adult present in building the entire time . Medications for the day of procedure: Timolol. Pt instructed to hold:Magnesium, Folic Acid, Vitamin A54, Vitamin D. Pt instructed to continue to hold Plavix as instructed by his surgeons office. Pt instructed to take 48 Units of Lantus tonight 8.17.22 and the morning of surgery. Pt voice understanding to hold Humalog the morning of surgery. The following discharge instructions reviewed with patient: medication given during procedure may cause drowsiness for several hours, therefore, do not drive or operate machinery for remainder of the day, no alcohol on the day of your procedure, resume regular diet and activity unless otherwise directed, for mild sore throat you may use Cepacol throat lozenges or warm salt water gargles as needed, call your physician for any problems or questions. Patient verbalizes understanding.

## 2022-08-18 ENCOUNTER — HOSPITAL ENCOUNTER (OUTPATIENT)
Age: 62
Setting detail: OUTPATIENT SURGERY
Discharge: HOME | End: 2022-08-18
Attending: INTERNAL MEDICINE | Admitting: INTERNAL MEDICINE
Payer: COMMERCIAL

## 2022-08-18 ENCOUNTER — ANESTHESIA (OUTPATIENT)
Dept: ENDOSCOPY | Age: 62
End: 2022-08-18
Payer: COMMERCIAL

## 2022-08-18 VITALS
HEIGHT: 72 IN | OXYGEN SATURATION: 95 % | BODY MASS INDEX: 33.86 KG/M2 | SYSTOLIC BLOOD PRESSURE: 151 MMHG | HEART RATE: 73 BPM | WEIGHT: 250 LBS | DIASTOLIC BLOOD PRESSURE: 72 MMHG | RESPIRATION RATE: 18 BRPM | TEMPERATURE: 97.7 F

## 2022-08-18 LAB
GLUCOSE BLD STRIP.AUTO-MCNC: 185 MG/DL (ref 65–100)
SERVICE CMNT-IMP: ABNORMAL

## 2022-08-18 PROCEDURE — 3700000000 HC ANESTHESIA ATTENDED CARE: Performed by: INTERNAL MEDICINE

## 2022-08-18 PROCEDURE — 2500000003 HC RX 250 WO HCPCS: Performed by: NURSE ANESTHETIST, CERTIFIED REGISTERED

## 2022-08-18 PROCEDURE — 2580000003 HC RX 258: Performed by: NURSE ANESTHETIST, CERTIFIED REGISTERED

## 2022-08-18 PROCEDURE — 2709999900 HC NON-CHARGEABLE SUPPLY: Performed by: INTERNAL MEDICINE

## 2022-08-18 PROCEDURE — 3609017100 HC EGD: Performed by: INTERNAL MEDICINE

## 2022-08-18 PROCEDURE — 82962 GLUCOSE BLOOD TEST: CPT

## 2022-08-18 PROCEDURE — 7100000010 HC PHASE II RECOVERY - FIRST 15 MIN: Performed by: INTERNAL MEDICINE

## 2022-08-18 PROCEDURE — 6360000002 HC RX W HCPCS: Performed by: NURSE ANESTHETIST, CERTIFIED REGISTERED

## 2022-08-18 PROCEDURE — 7100000011 HC PHASE II RECOVERY - ADDTL 15 MIN: Performed by: INTERNAL MEDICINE

## 2022-08-18 RX ORDER — PROPOFOL 10 MG/ML
INJECTION, EMULSION INTRAVENOUS PRN
Status: DISCONTINUED | OUTPATIENT
Start: 2022-08-18 | End: 2022-08-18 | Stop reason: SDUPTHER

## 2022-08-18 RX ORDER — LIDOCAINE HYDROCHLORIDE 20 MG/ML
INJECTION, SOLUTION EPIDURAL; INFILTRATION; INTRACAUDAL; PERINEURAL PRN
Status: DISCONTINUED | OUTPATIENT
Start: 2022-08-18 | End: 2022-08-18 | Stop reason: SDUPTHER

## 2022-08-18 RX ORDER — PROPOFOL 10 MG/ML
INJECTION, EMULSION INTRAVENOUS CONTINUOUS PRN
Status: DISCONTINUED | OUTPATIENT
Start: 2022-08-18 | End: 2022-08-18 | Stop reason: SDUPTHER

## 2022-08-18 RX ORDER — SODIUM CHLORIDE, SODIUM LACTATE, POTASSIUM CHLORIDE, CALCIUM CHLORIDE 600; 310; 30; 20 MG/100ML; MG/100ML; MG/100ML; MG/100ML
INJECTION, SOLUTION INTRAVENOUS CONTINUOUS PRN
Status: DISCONTINUED | OUTPATIENT
Start: 2022-08-18 | End: 2022-08-18 | Stop reason: SDUPTHER

## 2022-08-18 RX ADMIN — PROPOFOL 80 MG: 10 INJECTION, EMULSION INTRAVENOUS at 12:33

## 2022-08-18 RX ADMIN — PROPOFOL 20 MG: 10 INJECTION, EMULSION INTRAVENOUS at 12:35

## 2022-08-18 RX ADMIN — PROPOFOL 200 MCG/KG/MIN: 10 INJECTION, EMULSION INTRAVENOUS at 12:33

## 2022-08-18 RX ADMIN — LIDOCAINE HYDROCHLORIDE 100 MG: 20 INJECTION, SOLUTION EPIDURAL; INFILTRATION; INTRACAUDAL; PERINEURAL at 12:33

## 2022-08-18 RX ADMIN — SODIUM CHLORIDE, SODIUM LACTATE, POTASSIUM CHLORIDE, AND CALCIUM CHLORIDE: 600; 310; 30; 20 INJECTION, SOLUTION INTRAVENOUS at 12:30

## 2022-08-18 ASSESSMENT — PAIN - FUNCTIONAL ASSESSMENT
PAIN_FUNCTIONAL_ASSESSMENT: NONE - DENIES PAIN

## 2022-08-18 NOTE — PROGRESS NOTES
Pt discharged home with his wife, Francisco Damon. VSS. Discharge instructions reviewed with patient and family, understanding verbalized, and copy of instructions sent home with patient. All belongings sent home with patient.  Patient transported out via wheelchair by marito Marin

## 2022-08-18 NOTE — ANESTHESIA PRE PROCEDURE
Department of Anesthesiology  Preprocedure Note       Name:  Noel Burnett   Age:  64 y.o.  :  1960                                          MRN:  [id number]         Date:  2022      Surgeon: Neeru Irvin):  Mika Hyde MD    Procedure: Procedure(s):  EGD BAND LIGATION/ 34    Medications prior to admission:   Prior to Admission medications    Medication Sig Start Date End Date Taking?  Authorizing Provider   timolol (BLOCADREN) 20 MG tablet Take 1 tablet by mouth 2 times daily 22   Darylene Darner, MD   amLODIPine (NORVASC) 10 MG tablet Take 10 mg by mouth at bedtime TAKE 1 TABLET BY MOUTH EVERY DAY 11/10/20   Ar Automatic Reconciliation   aspirin 81 MG chewable tablet Take 81 mg by mouth at bedtime 17   Ar Automatic Reconciliation   benazepril (LOTENSIN) 40 MG tablet Take 40 mg by mouth at bedtime 18   Ar Automatic Reconciliation   chlorthalidone (HYGROTON) 25 MG tablet Take 25 mg by mouth at bedtime 21   Ar Automatic Reconciliation   Cholecalciferol 50 MCG (2000 UT) TABS Take by mouth at bedtime    Ar Automatic Reconciliation   clopidogrel (PLAVIX) 75 MG tablet Take 75 mg by mouth at bedtime 3/12/18   Ar Automatic Reconciliation   clotrimazole-betamethasone (LOTRISONE) 3-3.14 % cream 1 application 2 times daily 17   Ar Automatic Reconciliation   colestipol (COLESTID) 1 g tablet Take 1 g by mouth 2 times daily    Ar Automatic Reconciliation   cyanocobalamin 1000 MCG tablet Take 1,000 mcg by mouth at bedtime    Ar Automatic Reconciliation   cyanocobalamin 1000 MCG/ML injection 1,000mcg by IM route monthly 17   Ar Automatic Reconciliation   dapagliflozin (FARXIGA) 10 MG tablet Take by mouth at bedtime    Ar Automatic Reconciliation   dicyclomine (BENTYL) 10 MG capsule Take 10 mg by mouth 4 times daily 10/12/21   Ar Automatic Reconciliation   fenofibrate (TRIGLIDE) 160 MG tablet Take 160 mg by mouth at bedtime 17   Ar Automatic Reconciliation   folic acid (FOLVITE) 1 MG tablet Take 1 mg by mouth at bedtime 1/16/18   Ar Automatic Reconciliation   HYDROcodone-acetaminophen (NORCO) 5-325 MG per tablet Take 1 tablet by mouth every 8 hours as needed. 10/22/17   Ar Automatic Reconciliation   Icosapent Ethyl (VASCEPA) 1 g CAPS capsule Take 2 capsules by mouth 2 times daily (with meals) 10/9/20   Ar Automatic Reconciliation   Insulin Glargine, 2 Unit Dial, 300 UNIT/ML SOPN Inject 60 Units into the skin in the morning and at bedtime 1/6/21   Ar Automatic Reconciliation   insulin lispro (HUMALOG KWIKPEN) 200 UNIT/ML SOPN pen Inject 6-10 Units into the skin 3 times daily (before meals) 10/4/21   Ar Automatic Reconciliation   Magnesium Oxide 500 MG TABS Take 500 mg by mouth at bedtime    Ar Automatic Reconciliation   metFORMIN (GLUCOPHAGE) 500 MG tablet Take 1,000 mg by mouth 2 times daily (with meals)    Ar Automatic Reconciliation   nitroGLYCERIN (NITROSTAT) 0.4 MG SL tablet Place 0.4 mg under the tongue 8/28/17   Ar Automatic Reconciliation   ondansetron (ZOFRAN-ODT) 4 MG disintegrating tablet Take 4 mg by mouth every 8 hours as needed 10/27/17   Ar Automatic Reconciliation   pravastatin (PRAVACHOL) 40 MG tablet Take 40 mg by mouth at bedtime 1/16/18   Ar Automatic Reconciliation   tiZANidine (ZANAFLEX) 4 MG tablet Take 4 mg by mouth 3 times daily as needed    Ar Automatic Reconciliation   traZODone (DESYREL) 50 MG tablet Take 50 mg by mouth as needed    Ar Automatic Reconciliation       Current medications:    No current facility-administered medications for this encounter. Allergies:     Allergies   Allergen Reactions    Meloxicam Diarrhea       Problem List:    Patient Active Problem List   Diagnosis Code    Abnormal cardiovascular function study R94.30    Precordial pain R07.2    Chronic diarrhea K52.9    Sinusitis J32.9    IBS (irritable bowel syndrome) K58.9    Elevated liver function tests R79.89    Diabetes mellitus type II, controlled ([facility] E11.9    Dyspepsia and disorder of function of stomach K31.9, R10.13    Obesity E66.9    Balantidiasis A07.0    HTN (hypertension) I10    Type 2 diabetes mellitus with hyperglycemia, without long-term current use of insulin (HCC) E11.65    Ischemic heart disease I25.9    Neuropathy G62.9    Acute pancreatitis K85.90    Anxiety F41.9    Abdominal pain R10.9    Dyslipidemia Q84.5    Nonalcoholic fatty liver disease K76.0    CAD (coronary artery disease) I25.10    GERD (gastroesophageal reflux disease) K21.9       Past Medical History:        Diagnosis Date    Abdominal pain     RLQ abdominal pain    Abnormal AST and ALT     Abnormal cardiovascular function study 5/2/2016    Anxiety 7/18/2016    Arthritis     Back pain     Balantidiasis     Bilateral pain of leg and foot     Bronchitis     CAD (coronary artery disease)     Followed by [facility].     CAD S/P percutaneous coronary angioplasty     PCI to LCFx 2/9/09 NATIVE VESSEL      Cellulitis and abscess of toe of left foot     resolved    Chest pain     pt denies CP or SOB    Chronic diarrhea 3/19/2013    Claustrophobia     Cough     Current use of long term anticoagulation     Plavix and Aspirin    Diabetes mellitus type II, controlled ([address] 3/19/2013    oral and insulin reliant/AVG FBS:180/no s.s of hypoglycemia/Last A1c: 8.6    Diarrhea, functional     Dizziness and giddiness     Dyslipidemia 3/19/2013    Hyperlipidemia    Dyspepsia and disorder of function of stomach     Elevated liver function tests     Excessive sweating     Fatigue     Fatty liver     Gastrointestinal disorder     pancreatitis 3/2013    GERD (gastroesophageal reflux disease)     daily med    H/O heart artery stent     X4- last stent over a year ago    Heart failure ([address]     hx of    Hemorrhage of rectum and anus     HTN (hypertension) 3/19/2013    Hypertension     IBS (irritable bowel syndrome)     Keratosis, actinic     Knee pain     Metatarsalgia of right foot  Nausea & vomiting     needs antiemetic and will do fine    Neuropathy     Neutropenia (HCC)     Nonalcoholic fatty liver disease     Obesity     Other ill-defined conditions(799.89)     high cholesterol    Pain of left heel     Pancreatitis     Pharyngitis     Phlebitis after infusion     Plantar fasciitis of left foot     Proteinuria 12/4/14    Sinusitis     Thrombocytopenia (HCC)     Thromboembolus (HCC)     legs    Varicose veins of lower extremity     large varicose veins upper medial thigh       Past Surgical History:        Procedure Laterality Date    CARDIAC CATHETERIZATION      stents x 1  many years ago    CHOLECYSTECTOMY      elena    COLONOSCOPY      LASIK Bilateral     UPPER GASTROINTESTINAL ENDOSCOPY      VASCULAR SURGERY      sergo lower extremity vein stripping       Social History:    Social History     Tobacco Use    Smoking status: Never    Smokeless tobacco: Never   Substance Use Topics    Alcohol use: No                                Counseling given: Not Answered      Vital Signs (Current):   Vitals:    08/17/22 0921 08/18/22 1156   BP:  (!) 166/81   Pulse:  75   Resp:  18   Temp:  97.7 °F (36.5 °C)   TempSrc:  Oral   SpO2:  96%   Weight: 250 lb (113.4 kg) 250 lb (113.4 kg)   Height: 6' (1.829 m) 6' (1.829 m)                                              BP Readings from Last 3 Encounters:   08/18/22 (!) 166/81   05/26/22 120/70   02/01/22 (!) 149/75       NPO Status: Time of last liquid consumption: 1000                        Time of last solid consumption: 1900                        Date of last liquid consumption: 08/18/22                        Date of last solid food consumption: 08/17/22    BMI:   Wt Readings from Last 3 Encounters:   08/18/22 250 lb (113.4 kg)   05/26/22 248 lb 9.6 oz (112.8 kg)   02/01/22 248 lb (112.5 kg)     Body mass index is 33.91 kg/m².     CBC:   Lab Results   Component Value Date/Time    WBC 5.1 01/14/2022 06:46 AM    RBC 5.74 01/14/2022 06:46 AM    HGB 15.9 01/14/2022 06:46 AM    HCT 49.6 01/14/2022 06:46 AM    MCV 86.4 01/14/2022 06:46 AM    RDW 13.3 01/14/2022 06:46 AM     01/14/2022 06:46 AM       CMP:   Lab Results   Component Value Date/Time     01/14/2022 06:46 AM    K 3.9 01/14/2022 06:46 AM     01/14/2022 06:46 AM    CO2 25 01/14/2022 06:46 AM    BUN 23 01/14/2022 06:46 AM    CREATININE 0.85 08/09/2022 03:17 PM    CREATININE 0.85 01/14/2022 06:46 AM    GFRAA >60 01/14/2022 06:46 AM    LABGLOM >60 10/21/2021 08:32 AM    GLUCOSE 291 01/14/2022 06:46 AM    CALCIUM 9.3 01/14/2022 06:46 AM       POC Tests:   Recent Labs     08/18/22  1159   POCGLU 185*       Coags: No results found for: PROTIME, INR, APTT    HCG (If Applicable): No results found for: PREGTESTUR, PREGSERUM, HCG, HCGQUANT     ABGs: No results found for: PHART, PO2ART, DXG8CTY, EMY8RNP, BEART, O5CHEUFX     Type & Screen (If Applicable):  No results found for: LABABO, LABRH    Drug/Infectious Status (If Applicable):  No results found for: HIV, HEPCAB    COVID-19 Screening (If Applicable): No results found for: COVID19        Anesthesia Evaluation  Patient summary reviewed and Nursing notes reviewed no history of anesthetic complications:   Airway: Mallampati: II  TM distance: >3 FB   Neck ROM: full  Mouth opening: > = 3 FB   Dental:    (+) poor dentition      Pulmonary:Negative Pulmonary ROS and normal exam                               Cardiovascular:  Exercise tolerance: good (>4 METS),   (+) hypertension:, CAD:, CABG/stent:,                   Neuro/Psych:   (+) depression/anxiety             GI/Hepatic/Renal:   (+) GERD:, liver disease:,           Endo/Other:    (+) DiabetesType II DM, , .                 Abdominal:             Vascular:   + DVT, . Other Findings:           Anesthesia Plan      TIVA     ASA 3       Induction: intravenous. Anesthetic plan and risks discussed with patient and spouse.                         Karuna Benz MD 8/18/2022

## 2022-08-18 NOTE — H&P
[facility] Consult Note           Referring Physician:     Consult Date: 8/18/2022    Reason for Consult: portal HTN    History of Present Illness:  Patient is a 64 y.o. male who is seen in consultation for portal HTN. Past Medical History:   Diagnosis Date    Abdominal pain     RLQ abdominal pain    Abnormal AST and ALT     Abnormal cardiovascular function study 5/2/2016    Anxiety 7/18/2016    Arthritis     Back pain     Balantidiasis     Bilateral pain of leg and foot     Bronchitis     CAD (coronary artery disease)     Followed by [facility].     CAD S/P percutaneous coronary angioplasty     PCI to LCFx 2/9/09 NATIVE VESSEL      Cellulitis and abscess of toe of left foot     resolved    Chest pain     pt denies CP or SOB    Chronic diarrhea 3/19/2013    Claustrophobia     Cough     Current use of long term anticoagulation     Plavix and Aspirin    Diabetes mellitus type II, controlled ([address] 3/19/2013    oral and insulin reliant/AVG FBS:180/no s.s of hypoglycemia/Last A1c: 8.6    Diarrhea, functional     Dizziness and giddiness     Dyslipidemia 3/19/2013    Hyperlipidemia    Dyspepsia and disorder of function of stomach     Elevated liver function tests     Excessive sweating     Fatigue     Fatty liver     Gastrointestinal disorder     pancreatitis 3/2013    GERD (gastroesophageal reflux disease)     daily med    H/O heart artery stent     X4- last stent over a year ago    Heart failure ([address]     hx of    Hemorrhage of rectum and anus     HTN (hypertension) 3/19/2013    Hypertension     IBS (irritable bowel syndrome)     Keratosis, actinic     Knee pain     Metatarsalgia of right foot     Nausea & vomiting     needs antiemetic and will do fine    Neuropathy     Neutropenia (HCC)     Nonalcoholic fatty liver disease     Obesity     Other ill-defined conditions(799.89)     high cholesterol    Pain of left heel     Pancreatitis     Pharyngitis     Phlebitis after infusion     Plantar fasciitis of left foot     Proteinuria 12/4/14    Sinusitis     Thrombocytopenia (HCC)     Thromboembolus (HCC)     legs    Varicose veins of lower extremity     large varicose veins upper medial thigh      Past Surgical History:   Procedure Laterality Date    CARDIAC CATHETERIZATION      stents x 1  many years ago    CHOLECYSTECTOMY      elena    COLONOSCOPY      LASIK Bilateral     UPPER GASTROINTESTINAL ENDOSCOPY      VASCULAR SURGERY      sergo lower extremity vein stripping      Family History   Problem Relation Age of Onset    Heart Disease Father     Other Father         AAA    Other Mother         carotid endar. Cancer Father         Colon, tubular adenoma     Social History     Occupational History    Not on file   Tobacco Use    Smoking status: Never    Smokeless tobacco: Never   Vaping Use    Vaping Use: Never used   Substance and Sexual Activity    Alcohol use: No    Drug use: No    Sexual activity: Not on file       Hospital Medications:  No current facility-administered medications for this encounter. Allergies: Allergies   Allergen Reactions    Meloxicam Diarrhea       Review of Systems:  A comprehensive review of systems was negative except for that written in the History of Present Illness. Objective:     Physical Exam:  Vitals:  Visit Vitals  BP (!) 166/81   Pulse 75   Temp 97.7 °F (36.5 °C) (Oral)   Resp 18   Ht 6' (1.829 m)   Wt 250 lb (113.4 kg)   SpO2 96%   BMI 33.91 kg/m²       General: No acute distress. Skin:  Extremities and face reveal no rashes. No valencia erythema. No telangiectasias on the chest wall. HEENT: Sclerae anicteric. No oral ulcers. No abnormal pigmentation of the lips. The neck is supple. Cardiovascular: Regular rate and rhythm. No murmurs, gallops, or rubs. Respiratory:  Comfortable breathing  With no accessory muscle use. Clear breath sounds with no wheezes, rales, or rhonchi. GI:  Abdomen nondistended, soft, and nontender. Normal active bowel sounds.  No enlargement of the liver or spleen. No masses palpable. Musculoskeletal:  No pitting edema of the lower legs. Extremities have good range of motion. Neurological:  Gross memory appears intact. Patient is alert and oriented. Psychiatric:  Mood appears appropriate with judgement intact. Lymphatic:  No cervical or supraclavicular adenopathy. Laboratory:    No results for input(s): WBC, RBC, HGB, HCT, PLT in the last 72 hours. No results for input(s): GLU, NA, K, CL, CO2, BUN, CREA, CA in the last 72 hours. No results for input(s): INR, APTT in the last 72 hours. Invalid input(s): PTP  No results for input(s): ALB, TP, AML in the last 72 hours. Invalid input(s): TBIL, CBIL, SGOT, GPT, AP, LPSE    Assessment:       A 64 y.o. male with portal HTN      Plan:       EGD to screen for varices    Signed By: Nicky Mccracken.  MD Jessica     August 18, 2022

## 2022-08-18 NOTE — PROCEDURES
Endoscopic Gastroduodenoscopy Procedure Note    Indications: Portal HTN, varices screen    Anesthesia/Sedation: MAC IV     Pre-Procedure Physical:    No current facility-administered medications for this encounter. Meloxicam    Patient Vitals for the past 8 hrs:   BP Temp Temp src Pulse Resp SpO2 Height Weight   08/18/22 1156 (!) 166/81 97.7 °F (36.5 °C) Oral 75 18 96 % 6' (1.829 m) 250 lb (113.4 kg)       Exam      Airway: clear   Heart: normal S1and S2    Lungs: clear bilateral  Abdomen: soft, nontender, bowel sounds present and normal in all quads   Mental Status: awake, alert and oriented to person, place and time          Procedure Details     Informed consent was obtained for the procedure, including conscious sedation. Risks of pancreatitis, infection, perforation, hemorrhage, adverse drug reaction and aspiration were discussed. The patient was placed in the left lateral decubitus position. Based on the pre-procedure assessment, including review of the patient's medical history, medications, allergies, and review of systems, he had been deemed to be an appropriate candidate for conscious sedation; he was therefore sedated with the medications listed below. He was monitored continuously with ECG tracing, pulse oximetry, blood pressure monitoring, and direct observation. The EGD gastroscope was inserted into the mouth and advanced under direct vision to the second portion of the duodenum. A careful inspection was made as the gastroscope was withdrawn, including a retroflexed view of the proximal stomach; findings and interventions are described below. Appropriate photodocumentation was obtained. Findings:   Esophagus- Normal.  Stomach- Normal.  Duodenum- Normal.    Therapies: None    Specimens: None    Estimated Blood Loss: 0 cc           Complications:   None; patient tolerated the procedure well. Attending Attestation:  I performed the procedure. Impression:    Normal EGD. No esophageal varices.     Recommendations:  EGD in 1 year to rescreen for varices  Office in 6 mos

## 2022-08-18 NOTE — DISCHARGE INSTRUCTIONS
Gastrointestinal Esophagogastroduodenoscopy (EGD) - Upper Exam Discharge Instructions    1. Call Dr. Fernie Ybarra at [phone] for any problems or questions. 2. Contact the doctor's office for follow up appointment as directed. 3. Medication may cause drowsiness for several hours, therefore:  Do not drive or operate machinery for remainder of the day. No alcohol today. Do not make any important or legal decisions for 24 hours. Do not sign any legal documents for 24 hours. 5. Ordinarily, you may resume regular diet and activity after exam unless otherwise specified by your physician. 6. For mild soreness in your throat you may use Cepacol throat lozenges or warm salt-water gargles as needed. Any additional instructions: Repeat EGD in 1 year at [facility]. Return to office in 6 months. Resume taking Plavix today.

## 2023-01-16 ENCOUNTER — OFFICE VISIT (OUTPATIENT)
Dept: CARDIOLOGY CLINIC | Age: 63
End: 2023-01-16
Payer: COMMERCIAL

## 2023-01-16 VITALS
BODY MASS INDEX: 33.59 KG/M2 | HEIGHT: 72 IN | DIASTOLIC BLOOD PRESSURE: 70 MMHG | WEIGHT: 248 LBS | HEART RATE: 72 BPM | SYSTOLIC BLOOD PRESSURE: 142 MMHG

## 2023-01-16 DIAGNOSIS — R07.2 PRECORDIAL PAIN: ICD-10-CM

## 2023-01-16 DIAGNOSIS — R07.2 PRECORDIAL PAIN: Primary | ICD-10-CM

## 2023-01-16 PROBLEM — I20.0 ACCELERATING ANGINA (HCC): Status: ACTIVE | Noted: 2023-01-16

## 2023-01-16 LAB
ERYTHROCYTE [DISTWIDTH] IN BLOOD BY AUTOMATED COUNT: 14 % (ref 11.9–14.6)
HCT VFR BLD AUTO: 51.2 % (ref 41.1–50.3)
HGB BLD-MCNC: 16.9 G/DL (ref 13.6–17.2)
MCH RBC QN AUTO: 29.1 PG (ref 26.1–32.9)
MCHC RBC AUTO-ENTMCNC: 33 G/DL (ref 31.4–35)
MCV RBC AUTO: 88.1 FL (ref 82–102)
NRBC # BLD: 0 K/UL (ref 0–0.2)
PLATELET # BLD AUTO: 219 K/UL (ref 150–450)
PMV BLD AUTO: 10.4 FL (ref 9.4–12.3)
RBC # BLD AUTO: 5.81 M/UL (ref 4.23–5.6)
WBC # BLD AUTO: 5.6 K/UL (ref 4.3–11.1)

## 2023-01-16 PROCEDURE — 3077F SYST BP >= 140 MM HG: CPT | Performed by: INTERNAL MEDICINE

## 2023-01-16 PROCEDURE — 99214 OFFICE O/P EST MOD 30 MIN: CPT | Performed by: INTERNAL MEDICINE

## 2023-01-16 PROCEDURE — 3078F DIAST BP <80 MM HG: CPT | Performed by: INTERNAL MEDICINE

## 2023-01-16 RX ORDER — CHLORTHALIDONE 25 MG/1
25 TABLET ORAL NIGHTLY
Qty: 90 TABLET | Refills: 3 | Status: SHIPPED | OUTPATIENT
Start: 2023-01-16

## 2023-01-16 RX ORDER — PANCRELIPASE 24000; 76000; 120000 [USP'U]/1; [USP'U]/1; [USP'U]/1
CAPSULE, DELAYED RELEASE PELLETS ORAL
COMMUNITY
Start: 2022-10-31

## 2023-01-16 RX ORDER — TIMOLOL MALEATE 20 MG/1
20 TABLET ORAL 2 TIMES DAILY
Qty: 180 TABLET | Refills: 3 | Status: SHIPPED | OUTPATIENT
Start: 2023-01-16

## 2023-01-16 RX ORDER — CLOPIDOGREL BISULFATE 75 MG/1
75 TABLET ORAL NIGHTLY
Qty: 90 TABLET | Refills: 3 | Status: SHIPPED | OUTPATIENT
Start: 2023-01-16

## 2023-01-16 NOTE — PROGRESS NOTES
[facility] CARDIOLOGY  [address] [facility], [address]  PHONE: [phone]        23        NAME:  Jacky Lopez  : 1960  MRN: [id number]     CHIEF COMPLAINT:    Chest Pain        SUBJECTIVE:     Recent episode of exertional chest pain and profound leg fatigue. No recurrence. BP spiked to 190/90 yesterday. Medications were all reviewed with the patient today and updated as necessary. Current Outpatient Medications   Medication Sig    CREON 27294-74172 units delayed release capsule TAKE 1 CAPSULE 15 MINUTES BEFORE MEALS ORALLY THREE TIMES A DAY 90 DAYS    chlorthalidone (HYGROTON) 25 MG tablet Take 1 tablet by mouth at bedtime    timolol (BLOCADREN) 20 MG tablet Take 1 tablet by mouth 2 times daily    clopidogrel (PLAVIX) 75 MG tablet Take 1 tablet by mouth at bedtime    amLODIPine (NORVASC) 10 MG tablet Take 10 mg by mouth at bedtime TAKE 1 TABLET BY MOUTH EVERY DAY    aspirin 81 MG chewable tablet Take 81 mg by mouth at bedtime    benazepril (LOTENSIN) 40 MG tablet Take 40 mg by mouth at bedtime    Cholecalciferol 50 MCG (2000 UT) TABS Take by mouth at bedtime    clotrimazole-betamethasone (LOTRISONE) 6-0.95 % cream 1 application 2 times daily    colestipol (COLESTID) 1 g tablet Take 1 g by mouth 2 times daily    cyanocobalamin 1000 MCG tablet Take 1,000 mcg by mouth at bedtime    cyanocobalamin 1000 MCG/ML injection 1,000mcg by IM route monthly    dapagliflozin (FARXIGA) 10 MG tablet Take by mouth at bedtime    dicyclomine (BENTYL) 10 MG capsule Take 10 mg by mouth 4 times daily    fenofibrate (TRIGLIDE) 160 MG tablet Take 160 mg by mouth at bedtime    folic acid (FOLVITE) 1 MG tablet Take 1 mg by mouth at bedtime    HYDROcodone-acetaminophen (NORCO) 5-325 MG per tablet Take 1 tablet by mouth every 8 hours as needed.     Icosapent Ethyl (VASCEPA) 1 g CAPS capsule Take 2 capsules by mouth 2 times daily (with meals)    Insulin Glargine, 2 Unit Dial, 300 UNIT/ML SOPN Inject 65 Units into the skin in the morning and at bedtime    insulin lispro (HUMALOG KWIKPEN) 200 UNIT/ML SOPN pen Inject 6-10 Units into the skin 3 times daily (before meals)    Magnesium Oxide 500 MG TABS Take 500 mg by mouth at bedtime    metFORMIN (GLUCOPHAGE) 500 MG tablet Take 1,000 mg by mouth 2 times daily (with meals)    nitroGLYCERIN (NITROSTAT) 0.4 MG SL tablet Place 0.4 mg under the tongue    ondansetron (ZOFRAN-ODT) 4 MG disintegrating tablet Take 4 mg by mouth every 8 hours as needed    pravastatin (PRAVACHOL) 40 MG tablet Take 40 mg by mouth at bedtime    tiZANidine (ZANAFLEX) 4 MG tablet Take 4 mg by mouth 3 times daily as needed    traZODone (DESYREL) 50 MG tablet Take 50 mg by mouth as needed     No current facility-administered medications for this visit. Allergies   Allergen Reactions    Meloxicam Diarrhea           PHYSICAL EXAM:     Wt Readings from Last 3 Encounters:   01/16/23 248 lb (112.5 kg)   08/18/22 250 lb (113.4 kg)   05/26/22 248 lb 9.6 oz (112.8 kg)     BP Readings from Last 3 Encounters:   01/16/23 (!) 142/70   08/18/22 (!) 151/72   05/26/22 120/70       BP (!) 142/70   Pulse 72   Ht 6' (1.829 m)   Wt 248 lb (112.5 kg)   BMI 33.63 kg/m²     Physical Exam  Vitals reviewed. HENT:      Head: Normocephalic and atraumatic. Eyes:      Extraocular Movements: Extraocular movements intact. Pupils: Pupils are equal, round, and reactive to light. Cardiovascular:      Rate and Rhythm: Normal rate. Heart sounds: Normal heart sounds. Pulmonary:      Effort: Pulmonary effort is normal.      Breath sounds: Normal breath sounds. Abdominal:      General: Abdomen is flat. Palpations: Abdomen is soft. There is no mass. Musculoskeletal:         General: Normal range of motion. Cervical back: Normal range of motion. Skin:     General: Skin is warm and dry. Neurological:      General: No focal deficit present.       Mental Status: He is alert and oriented to person, place, and time. Psychiatric:         Mood and Affect: Mood normal.         RECENT LABS AND RECORDS REVIEW          ASSESSMENT and PLAN    Hair Burns was seen today for chest pain. Diagnoses and all orders for this visit:    Precordial pain  -     Case Request Cardiac Cath Lab    Other orders  -     chlorthalidone (HYGROTON) 25 MG tablet; Take 1 tablet by mouth at bedtime  -     timolol (BLOCADREN) 20 MG tablet; Take 1 tablet by mouth 2 times daily  -     clopidogrel (PLAVIX) 75 MG tablet; Take 1 tablet by mouth at bedtime     Return for will schedule after procedure.        Tammi Alonso MD  1/16/2023  2:06 PM

## 2023-01-17 LAB
ANION GAP SERPL CALC-SCNC: 11 MMOL/L (ref 2–11)
BUN SERPL-MCNC: 22 MG/DL (ref 8–23)
CALCIUM SERPL-MCNC: 11.4 MG/DL (ref 8.3–10.4)
CHLORIDE SERPL-SCNC: 103 MMOL/L (ref 101–110)
CO2 SERPL-SCNC: 24 MMOL/L (ref 21–32)
CREAT SERPL-MCNC: 1 MG/DL (ref 0.8–1.5)
GLUCOSE SERPL-MCNC: 211 MG/DL (ref 65–100)
MAGNESIUM SERPL-MCNC: 2.2 MG/DL (ref 1.8–2.4)
POTASSIUM SERPL-SCNC: 4.3 MMOL/L (ref 3.5–5.1)
SODIUM SERPL-SCNC: 138 MMOL/L (ref 133–143)

## 2023-01-18 NOTE — PROGRESS NOTES
Pre-procedure call completed. Instructed to take ASA 81 mg x 4 before arrival along with all other am prescription medications while HOLDING all vitamins, supplements, Metformin,and to only take 1/2 dose of insulin tonight and hold all insulin in the am. Instructed to remain NPO after MN.

## 2023-01-19 ENCOUNTER — HOSPITAL ENCOUNTER (OUTPATIENT)
Age: 63
Setting detail: OUTPATIENT SURGERY
Discharge: HOME | End: 2023-01-19
Attending: INTERNAL MEDICINE | Admitting: INTERNAL MEDICINE
Payer: COMMERCIAL

## 2023-01-19 VITALS
BODY MASS INDEX: 33.59 KG/M2 | OXYGEN SATURATION: 95 % | RESPIRATION RATE: 16 BRPM | HEIGHT: 72 IN | TEMPERATURE: 98 F | HEART RATE: 70 BPM | WEIGHT: 248 LBS | DIASTOLIC BLOOD PRESSURE: 71 MMHG | SYSTOLIC BLOOD PRESSURE: 156 MMHG

## 2023-01-19 DIAGNOSIS — I20.0 ACCELERATING ANGINA (HCC): ICD-10-CM

## 2023-01-19 DIAGNOSIS — I25.10 CAD (CORONARY ARTERY DISEASE): Primary | ICD-10-CM

## 2023-01-19 LAB
ACT BLD: 245 SECS (ref 70–128)
EKG ATRIAL RATE: 63 BPM
EKG ATRIAL RATE: 67 BPM
EKG DIAGNOSIS: NORMAL
EKG DIAGNOSIS: NORMAL
EKG P AXIS: 46 DEGREES
EKG P AXIS: 53 DEGREES
EKG P-R INTERVAL: 164 MS
EKG P-R INTERVAL: 168 MS
EKG Q-T INTERVAL: 420 MS
EKG Q-T INTERVAL: 426 MS
EKG QRS DURATION: 102 MS
EKG QRS DURATION: 98 MS
EKG QTC CALCULATION (BAZETT): 435 MS
EKG QTC CALCULATION (BAZETT): 443 MS
EKG R AXIS: 0 DEGREES
EKG R AXIS: 27 DEGREES
EKG T AXIS: 49 DEGREES
EKG T AXIS: 61 DEGREES
EKG VENTRICULAR RATE: 63 BPM
EKG VENTRICULAR RATE: 67 BPM
GLUCOSE BLD STRIP.AUTO-MCNC: 243 MG/DL (ref 65–100)
SERVICE CMNT-IMP: ABNORMAL

## 2023-01-19 PROCEDURE — C1725 CATH, TRANSLUMIN NON-LASER: HCPCS | Performed by: INTERNAL MEDICINE

## 2023-01-19 PROCEDURE — C1769 GUIDE WIRE: HCPCS | Performed by: INTERNAL MEDICINE

## 2023-01-19 PROCEDURE — 6370000000 HC RX 637 (ALT 250 FOR IP): Performed by: INTERNAL MEDICINE

## 2023-01-19 PROCEDURE — 6360000002 HC RX W HCPCS: Performed by: INTERNAL MEDICINE

## 2023-01-19 PROCEDURE — 93458 L HRT ARTERY/VENTRICLE ANGIO: CPT | Performed by: INTERNAL MEDICINE

## 2023-01-19 PROCEDURE — 82962 GLUCOSE BLOOD TEST: CPT

## 2023-01-19 PROCEDURE — 2500000003 HC RX 250 WO HCPCS: Performed by: INTERNAL MEDICINE

## 2023-01-19 PROCEDURE — 99152 MOD SED SAME PHYS/QHP 5/>YRS: CPT | Performed by: INTERNAL MEDICINE

## 2023-01-19 PROCEDURE — 6360000004 HC RX CONTRAST MEDICATION: Performed by: INTERNAL MEDICINE

## 2023-01-19 PROCEDURE — 92920 PRQ TRLUML C ANGIOP 1ART&/BR: CPT | Performed by: INTERNAL MEDICINE

## 2023-01-19 PROCEDURE — 99153 MOD SED SAME PHYS/QHP EA: CPT | Performed by: INTERNAL MEDICINE

## 2023-01-19 PROCEDURE — C1887 CATHETER, GUIDING: HCPCS | Performed by: INTERNAL MEDICINE

## 2023-01-19 PROCEDURE — 92928 PRQ TCAT PLMT NTRAC ST 1 LES: CPT | Performed by: INTERNAL MEDICINE

## 2023-01-19 PROCEDURE — 85347 COAGULATION TIME ACTIVATED: CPT

## 2023-01-19 PROCEDURE — 93005 ELECTROCARDIOGRAM TRACING: CPT | Performed by: INTERNAL MEDICINE

## 2023-01-19 PROCEDURE — C1894 INTRO/SHEATH, NON-LASER: HCPCS | Performed by: INTERNAL MEDICINE

## 2023-01-19 PROCEDURE — 2709999900 HC NON-CHARGEABLE SUPPLY: Performed by: INTERNAL MEDICINE

## 2023-01-19 RX ORDER — MIDAZOLAM HYDROCHLORIDE 1 MG/ML
INJECTION INTRAMUSCULAR; INTRAVENOUS PRN
Status: DISCONTINUED | OUTPATIENT
Start: 2023-01-19 | End: 2023-01-19 | Stop reason: HOSPADM

## 2023-01-19 RX ORDER — CLOPIDOGREL BISULFATE 75 MG/1
TABLET ORAL PRN
Status: DISCONTINUED | OUTPATIENT
Start: 2023-01-19 | End: 2023-01-19 | Stop reason: HOSPADM

## 2023-01-19 RX ORDER — HEPARIN SODIUM 10000 [USP'U]/ML
INJECTION, SOLUTION INTRAVENOUS; SUBCUTANEOUS PRN
Status: DISCONTINUED | OUTPATIENT
Start: 2023-01-19 | End: 2023-01-19 | Stop reason: HOSPADM

## 2023-01-19 RX ORDER — SODIUM CHLORIDE 9 MG/ML
INJECTION, SOLUTION INTRAVENOUS CONTINUOUS
Status: DISCONTINUED | OUTPATIENT
Start: 2023-01-19 | End: 2023-01-19 | Stop reason: HOSPADM

## 2023-01-19 RX ORDER — ASPIRIN 81 MG/1
324 TABLET, CHEWABLE ORAL ONCE
Status: DISCONTINUED | OUTPATIENT
Start: 2023-01-19 | End: 2023-01-19 | Stop reason: HOSPADM

## 2023-01-19 RX ORDER — LIDOCAINE HYDROCHLORIDE 10 MG/ML
INJECTION, SOLUTION INFILTRATION; PERINEURAL PRN
Status: DISCONTINUED | OUTPATIENT
Start: 2023-01-19 | End: 2023-01-19 | Stop reason: HOSPADM

## 2023-01-19 RX ORDER — HEPARIN SODIUM 200 [USP'U]/100ML
INJECTION, SOLUTION INTRAVENOUS CONTINUOUS PRN
Status: COMPLETED | OUTPATIENT
Start: 2023-01-19 | End: 2023-01-19

## 2023-01-19 RX ORDER — NITROGLYCERIN 20 MG/100ML
INJECTION INTRAVENOUS PRN
Status: DISCONTINUED | OUTPATIENT
Start: 2023-01-19 | End: 2023-01-19 | Stop reason: HOSPADM

## 2023-01-19 ASSESSMENT — PAIN SCALES - GENERAL
PAINLEVEL_OUTOF10: 0
PAINLEVEL_OUTOF10: 0

## 2023-01-19 NOTE — DISCHARGE SUMMARY
[facility] Discharge Summary     Patient ID:  Piotr Sommers  [id number]  1 y.o.  1960    Admit date: 1/19/2023    Discharge date:  1/19/2023     Admitting Physician: Shanna Blake MD     Discharge Physician: Dr. Christianne Bosworth    Admission Diagnoses: Accelerating angina [facility]) [I20.0]    Discharge Diagnoses:   Patient Active Problem List    Diagnosis    Accelerating angina [facility])    Ischemic heart disease    Precordial pain    Type 2 diabetes mellitus with hyperglycemia, without long-term current use of insulin ([address]    CAD (coronary artery disease)    HTN (hypertension)    Dyslipidemia     Cardiology Procedures this admission:  Left heart catheterization with PCI  Consults: none    Hospital Course: Patient was seen at the office of [facility] Card[facility] by Dr. Christianne Bosworth for complaints of chest pain and was subsequently scheduled for an AM Admission LHC at [facility] on 1/19/2023. Patient underwent cardiac catheterization by Dr. Christianne Bosworth. Patient was found to have 70% focal stenosis of the LCx that underwent POBA with good results. Patient tolerated the procedure well and was taken to recovery. The afternoon of discharge, patient was up feeling well without any complaints of chest pain or shortness of breath. Patient's right radial cath site was clean, dry and intact without hematoma or bruit. Patient's labs were stable. Patient was seen and examined by Dr. Christianne Bosworth and determined stable and ready for discharge. Patient was instructed on the importance of medication compliance including taking aspirin and Plavix everyday without missing a dose. For maximized medical therapy for CAD, patient will continue ACE-I and statin as well. The patient will follow up with [facility] -- Dr. Christianne Bosworth on Feb 6th at 9:45 am in [facility]. Patient has been referred to cardiac rehab. DISPOSITION: The patient is being discharged home in stable condition on a low saturated fat, low cholesterol and low salt diet.  The patient is instructed to advance activities as tolerated to the limit of fatigue or shortness of breath. The patient is instructed to avoid all heavy lifting, straining, stooping or squatting for 3-5 days. The patient is instructed to watch the cath site for bleeding/oozing; if seen, the patient is instructed to apply firm pressure with a clean cloth and call [facility] at [phone]. The patient is instructed to watch for signs of infection which include: increasing area of redness, fever/hot to touch or purulent drainage at the catheterization site. The patient is instructed not to soak in a bathtub for 7-10 days, but is cleared to shower. The patient is instructed to call the office or return to the ER for immediate evaluation for any shortness of breath or chest pain not relieved by NTG. Discharge Exam: Patient has been seen by Dr. Marianna Castaneda: see his progress note for exam details.     BP (!) 150/77   Pulse 62   Temp 98 °F (36.7 °C) (Oral)   Resp 18   Ht 6' (1.829 m)   Wt 248 lb (112.5 kg)   SpO2 96%   BMI 33.63 kg/m²     Recent Results (from the past 24 hour(s))   POCT Glucose    Collection Time: 01/19/23  7:34 AM   Result Value Ref Range    POC Glucose 243 (H) 65 - 100 mg/dL    Performed by: Stiven    EKG 12 Lead    Collection Time: 01/19/23  7:48 AM   Result Value Ref Range    Ventricular Rate 63 BPM    Atrial Rate 63 BPM    P-R Interval 168 ms    QRS Duration 102 ms    Q-T Interval 426 ms    QTc Calculation (Bazett) 435 ms    P Axis 53 degrees    R Axis 27 degrees    T Axis 49 degrees    Diagnosis Normal sinus rhythm    POCT activated clotting time    Collection Time: 01/19/23  9:34 AM   Result Value Ref Range    Activated Clotting Time 245 (H) 70 - 128 SECS   Cardiac procedure    Collection Time: 01/19/23  9:56 AM   Result Value Ref Range    Body Surface Area 2.39 m2     Patient Instructions:   Current Discharge Medication List        CONTINUE these medications which have NOT CHANGED    Details CREON 29270-93346 units delayed release capsule TAKE 1 CAPSULE 15 MINUTES BEFORE MEALS ORALLY THREE TIMES A DAY 90 DAYS      chlorthalidone (HYGROTON) 25 MG tablet Take 1 tablet by mouth at bedtime  Qty: 90 tablet, Refills: 3      timolol (BLOCADREN) 20 MG tablet Take 1 tablet by mouth 2 times daily  Qty: 180 tablet, Refills: 3      clopidogrel (PLAVIX) 75 MG tablet Take 1 tablet by mouth at bedtime  Qty: 90 tablet, Refills: 3      amLODIPine (NORVASC) 10 MG tablet Take 10 mg by mouth at bedtime TAKE 1 TABLET BY MOUTH EVERY DAY      aspirin 81 MG chewable tablet Take 81 mg by mouth at bedtime      benazepril (LOTENSIN) 40 MG tablet Take 40 mg by mouth at bedtime      Cholecalciferol 50 MCG (2000 UT) TABS Take by mouth at bedtime      clotrimazole-betamethasone (LOTRISONE) 1-9.57 % cream 1 application 2 times daily      colestipol (COLESTID) 1 g tablet Take 1 g by mouth 2 times daily      cyanocobalamin 1000 MCG tablet Take 1,000 mcg by mouth at bedtime      cyanocobalamin 1000 MCG/ML injection 1,000mcg by IM route monthly      dapagliflozin (FARXIGA) 10 MG tablet Take by mouth at bedtime      dicyclomine (BENTYL) 10 MG capsule Take 10 mg by mouth 4 times daily      fenofibrate (TRIGLIDE) 160 MG tablet Take 160 mg by mouth at bedtime      folic acid (FOLVITE) 1 MG tablet Take 1 mg by mouth at bedtime      HYDROcodone-acetaminophen (NORCO) 5-325 MG per tablet Take 1 tablet by mouth every 8 hours as needed.       Icosapent Ethyl (VASCEPA) 1 g CAPS capsule Take 2 capsules by mouth 2 times daily (with meals)      Insulin Glargine, 2 Unit Dial, 300 UNIT/ML SOPN Inject 65 Units into the skin in the morning and at bedtime      insulin lispro (HUMALOG KWIKPEN) 200 UNIT/ML SOPN pen Inject 6-10 Units into the skin 3 times daily (before meals)      Magnesium Oxide 500 MG TABS Take 500 mg by mouth at bedtime      metFORMIN (GLUCOPHAGE) 500 MG tablet Take 1,000 mg by mouth 2 times daily (with meals)      nitroGLYCERIN (NITROSTAT) 0.4 MG SL tablet Place 0.4 mg under the tongue      ondansetron (ZOFRAN-ODT) 4 MG disintegrating tablet Take 4 mg by mouth every 8 hours as needed      pravastatin (PRAVACHOL) 40 MG tablet Take 40 mg by mouth at bedtime      tiZANidine (ZANAFLEX) 4 MG tablet Take 4 mg by mouth 3 times daily as needed      traZODone (DESYREL) 50 MG tablet Take 50 mg by mouth as needed               Signed:  SHANEL Guillen  1/19/2023  10:29 AM

## 2023-01-19 NOTE — PROGRESS NOTES
Report received from [address] [place] [address]. Procedural finding communicated. Intra procedural medication administration reviewed. Progression of care discussed. Patient received into CPRU room 6, Post C    Access site without bleeding or swelling. Right wrist    Patient instructed to limit movement of right upper extremity. Routine post procedural vital signs & site assessment initiated.

## 2023-01-19 NOTE — DISCHARGE INSTRUCTIONS
POST PCI/STENT DISCHARGE INSTRUCTIONS    1. Check puncture site frequently for swelling or bleeding. If there is any bleeding, lie down and apply pressure over the area with a clean towel or washcloth and call 911. Notify your doctor for any redness, swelling, drainage, or oozing from the puncture site. Notify your doctor for any fever or chills. 2. If the extremity becomes cold, numb, or painful call [facility] at [phone].    3. Activity should be limited for the next 48-72 hours. No heavy lifting (anything over 10 pounds) for 3 days. No pushing or pulling with right wrist for the next three days. 4.  You may resume your usual diet. Drink more fluids than usual.    5.  Have a responsible person drive you home and stay with you for at least 24 hours after your heart catheterization/angiography. No driving for 24 hours. 6. You may remove bandage from your right wrist in 24 hours. You may shower in 24 hours. No tub baths, hot tubs, or swimming for 1 week. Do not place any lotions, creams, powders, or ointments over puncture site for 1 week. You may place a clean band-aid over the puncture site each day for 5 days. Change daily. YOU ARE BEING DISCHARGED ON TWO ANTI-PLATELET MEDICATIONS    1- Plavix    2- Aspirin    THESE MEDICATIONS  ARE VERY IMPORTANT TO YOUR RECOVERY AND  MUST BE TAKEN EXACTLY AS PRESCRIBED & NOT STOPPED FOR ANY REASON. THESE MAY ONLY BE STOPPED WITH AN ORDER FROM YOUR CARDIOLOGIST. PLEASE HAVE THESE FILLED IMMEDIATELY AND START TAKING tomorrow      YOU ARE ALSO BEING DISCHARGED ON A MEDICATION FOR CHOLESTEROL MANAGEMENT. 1- pravastatin      You have also been referred to [facility]. Someone from Cardiac Rehab will be calling you to set up a follow up appointment. If they have not called you within a week,  please call [phone]. Sedation for a Medical Procedure: Care Instructions     You were given a sedative medication during your visit. While many of the effects will have worn   off before you leave; you may continue to feel some effects for several hours. Common side effects from sedation include:  Feeling sleepy. (Your doctors and nurses will make sure you are not too sleepy to go home.)  Nausea and vomiting. This usually does not last long. Feeling tired. How can you care for yourself at home? Activity    Don't do anything for 24 hours that requires attention to detail. It takes time for the medicine effects to completely wear off. Do not make important legal decisions for 24 hours. Do not sign any legal documents for 24 hours. Do not drink alcohol today     For your safety, you should not drive or operate heavy machinery for the remainder of the day     Rest when you feel tired. Getting enough sleep will help you recover. Diet    You can eat your normal diet, unless your doctor gives you other instructions. If your stomach is upset, try clear liquids and bland, low-fat foods like plain toast or rice. Drink plenty of fluids (unless your doctor tells you not to). Don't drink alcohol for 24 hours. Medicines    Be safe with medicines. Read and follow all instructions on the label. If the doctor gave you a prescription medicine for pain, take it as prescribed. If you are not taking a prescription pain medicine, ask your doctor if you can take an over-the-counter medicine. If you think your pain medicine is making you sick to your stomach: Take your medicine after meals (unless your doctor has told you not to). Ask your doctor for a different pain medicine. Percutaneous Coronary Intervention: What to Expect at [facility]     Percutaneous coronary intervention (PCI) is the name for procedures that are used to open a narrowed or blocked coronary artery. The two most common PCI procedures are coronary angioplasty and coronary stent placement.   Your groin or arm may have a bruise and feel sore for a day or two after a percutaneous coronary intervention (PCI). You can do light activities around the house, but nothing strenuous for several days. This care sheet gives you a general idea about how long it will take for you to recover. But each person recovers at a different pace. Follow the steps below to get better as quickly as possible. How can you care for yourself at home? Activity  Do not do strenuous exercise and do not lift, pull, or push anything heavy until your doctor says it is okay. This may be for a day or two. You can walk around the house and do light activity, such as cooking. You may shower 24 to 48 hours after the procedure, if your doctor okays it. Pat the incision dry. Do not take a bath for 1 week, or until your doctor tells you it is okay. If the catheter was placed in your groin, try not to walk up stairs for the first couple of days. If the catheter was placed in your arm near your wrist, do not bend your wrist deeply for the first couple of days. Be careful using your hand to get into and out of a chair or bed. If your doctor recommends it, get more exercise. Walking is a good choice. Bit by bit, increase the amount you walk every day. Try for at least 30 minutes on most days of the week. Diet  Drink plenty of fluids to help your body flush out the dye. If you have kidney, heart, or liver disease and have to limit fluids, talk with your doctor before you increase the amount of fluids you drink. Keep eating a heart-healthy diet that has lots of fruits, vegetables, and whole grains. If you have not been eating this way, talk to your doctor. You also may want to talk to a dietitian. This expert can help you to learn about healthy foods and plan meals. Medicines  Your doctor will tell you if and when you can restart your medicines. He or she will also give you instructions about taking any new medicines.   If you take blood thinners, such as warfarin (Coumadin), clopidogrel (Plavix), or aspirin, be sure to talk to your doctor. He or she will tell you if and when to start taking those medicines again. Make sure that you understand exactly what your doctor wants you to do. Your doctor will prescribe blood-thinning medicines. You will likely take aspirin plus another antiplatelet, such as clopidogrel (Plavix). It is very important that you take these medicines exactly as directed. These medicines help keep the coronary artery open and reduce your risk of a heart attack. Call your doctor if you think you are having a problem with your medicine. Care of the catheter site  For 1 or 2 days, keep a bandage over the spot where the catheter was inserted. The bandage probably will fall off in this time. Put ice or a cold pack on the area for 10 to 20 minutes at a time to help with soreness or swelling. Put a thin cloth between the ice and your skin. Follow-up care is a key part of your treatment and safety. Be sure to make and go to all appointments, and call your doctor if you are having problems. It's also a good idea to know your test results and keep a list of the medicines you take. When should you call for help? Call 911 anytime you think you may need emergency care. For example, call if:  You passed out (lost consciousness). You have severe trouble breathing. You have sudden chest pain and shortness of breath, or you cough up blood. You have symptoms of a heart attack, such as:  Chest pain or pressure. Sweating. Shortness of breath. Nausea or vomiting. Pain that spreads from the chest to the neck, jaw, or one or both shoulders or arms. Dizziness or lightheadedness. A fast or uneven pulse. After calling 911, chew 1 adult-strength aspirin. Wait for an ambulance. Do not try to drive yourself.   You have been diagnosed with angina, and you have angina symptoms that do not go away with rest or are not getting better within 5 minutes after you take one dose of nitroglycerin. Call your doctor now or seek immediate medical care if:  You are bleeding from the area where the catheter was put in your artery. You have a fast-growing, painful lump at the catheter site. You have signs of infection, such as: Increased pain, swelling, warmth, or redness. Red streaks leading from the catheter site. Pus draining from the catheter site. A fever. Your leg or arm looks blue or feels cold, numb, or tingly. Watch closely for changes in your health, and be sure to contact your doctor if you have any problems. Where can you learn more? Go to Groovideo.be  Enter G658 in the search box to learn more about \"Percutaneous Coronary Intervention: What to Expect at Home. \"   © 5424-1485 Healthwise, Incorporated. Care instructions adapted under license by [employer] (which disclaims liability or warranty for this information). This care instruction is for use with your licensed healthcare professional. If you have questions about a medical condition or this instruction, always ask your healthcare professional. [place] [address] any warranty or liability for your use of this information. Content Version: 67.2.078110; Current as of: May 22, 2015         Cardiac Rehabilitation: Care Instructions  Your Care Instructions    Cardiac rehabilitation is a program for people who have a heart problem, such as a heart attack, heart failure, or a heart valve disease. The program includes exercise, lifestyle changes, education, and emotional support. Cardiac rehab can help you improve the quality of your life through better overall health. It can help you lose weight and feel better about yourself. On your cardiac rehab team, you may have your doctor, a nurse specialist, a dietitian, and a physical therapist. They will design your cardiac rehab program specifically for you.  You will learn how to reduce your risk for heart problems, how to manage stress, and how to eat a heart-healthy diet. By the end of the program, you will be ready to maintain a healthier lifestyle on your own. Follow-up care is a key part of your treatment and safety. Be sure to make and go to all appointments, and call your doctor if you are having problems. It's also a good idea to know your test results and keep a list of the medicines you take. How can you care for yourself at home? Take your medicines exactly as prescribed. Call your doctor if you think you are having a problem with your medicine. You will get more details on the specific medicines your doctor prescribes. Weigh yourself every day if your doctor tells you to. Watch for sudden weight gain. Weigh yourself on the same scale with the same amount of clothing at the same time of day. Plan your meals so that you are eating heart-healthy foods. Eat a variety of foods daily. Fresh fruits and vegetables and whole-grains are good choices. Limit your fat intake, especially saturated and trans fat. Limit salt (sodium). Increase fiber in your diet. Limit alcohol. Learn how to take your pulse so that you can track your heart rate during exercise. Always check with your doctor before you begin a new exercise program.  Warm up before you exercise and cool down afterward for at least 15 minutes each. This will help your heart gradually prepare for and recover from exercise and avoid pushing your heart too hard. Stop exercising if you have any unusual discomfort, such as chest pain. Do not smoke. Smoking can make heart problems worse. If you need help quitting, talk to your doctor about stop-smoking programs and medicines. These can increase your chances of quitting for good. When should you call for help? Call 911 anytime you think you may need emergency care. For example, call if:  You have severe trouble breathing. You cough up pink, foamy mucus and you have trouble breathing. You have symptoms of a heart attack.  These may include:  Chest pain or pressure, or a strange feeling in the chest.  Sweating. Shortness of breath. Nausea or vomiting. Pain, pressure, or a strange feeling in the back, neck, jaw, or upper belly or in one or both shoulders or arms. Lightheadedness or sudden weakness. A fast or irregular heartbeat. After you call 911, the  may tell you to chew 1 adult-strength or 2 to 4 low-dose aspirin. Wait for an ambulance. Do not try to drive yourself. You have angina symptoms (such as chest pain or pressure) that do not go away with rest or are not getting better within 5 minutes after you take a dose of nitroglycerin. You have symptoms of a stroke. These may include:  Sudden numbness, tingling, weakness, or loss of movement in your face, arm, or leg, especially on only one side of your body. Sudden vision changes. Sudden trouble speaking. Sudden confusion or trouble understanding simple statements. Sudden problems with walking or balance. A sudden, severe headache that is different from past headaches. You passed out (lost consciousness). Call your doctor now or seek immediate medical care if:  You have new or increased shortness of breath. You are dizzy or lightheaded, or you feel like you may faint. You gain weight suddenly, such as 3 pounds or more in 2 to 3 days. (Your doctor may suggest a different range of weight gain.)  You have increased swelling in your legs, ankles, or feet. Watch closely for changes in your health, and be sure to contact your doctor if you have any problems. Where can you learn more? Go to http://soham-satya.info/. Enter T837 in the search box to learn more about \"Cardiac Rehabilitation: Care Instructions. \"  Current as of: May 5, 2016  Content Version: 11.1  © 9230-3141 [employer]. Care instructions adapted under license by [employer] (which disclaims liability or warranty for this information).  If you have questions about a medical condition or this instruction, always ask your healthcare professional. [address] any warranty or liability for your use of this information. [employer] System[employer]ar[employer]Box Activation    Thank you for requesting access to [facility]. Please follow the instructions below to securely access and download your online medical record. [facility] allows you to send messages to your doctor, view your test results, renew your prescriptions, schedule appointments, and more. How Do I Sign Up? In your internet browser, go to [email or web address]. Click on the First Time User? Click Here link in the Sign In box. You will see the New Member Sign Up page. Enter your [facility] Access Code exactly as it appears below. You will not need to use this code after youve completed the sign-up process. If you do not sign up before the expiration date, you must request a new code. [facility] Access Code: Activation code not generated  Current [facility] Status: Active (This is the date your [facility] access code will )    Enter the last four digits of your Social Security Number (xxxx) and Date of Birth (mm/dd/yyyy) as indicated and click Submit. You will be taken to the next sign-up page. Create a [facility] ID. This will be your [facility] login ID and cannot be changed, so think of one that is secure and easy to remember. Create a [facility] password. You can change your password at any time. Enter your Password Reset Question and Answer. This can be used at a later time if you forget your password. Enter your e-mail address. You will receive e-mail notification when new information is available in [address] Click Sign Up. You can now view and download portions of your medical record. Click the [place] link to download a portable copy of your medical information.     Additional Information    If you have questions, please visit the Frequently Asked Questions section of the [employer] website at [email or web address] Remember, Kii is NOT to be used for urgent needs. For medical emergencies, dial 911.

## 2023-01-19 NOTE — PROGRESS NOTES
TRANSFER - OUT REPORT:    R radial [facility] with Dr Lv Beyer  Versed 2 mg  Heparin 11,000 units  Balloon to the Circ  TR band 12 ml  No bleeding or hematoma noted at site. Site soft    Verbal report given to Humera Guthrie on [address]  being transferred to [facility] for routine progression of patient care       Report consisted of patient's Situation, Background, Assessment and   Recommendations(SBAR). Information from the following report(s) MAR and Event Log was reviewed with the receiving nurse. [place] Assessment: No data recorded  Lines:   Peripheral IV 01/19/23 Right Antecubital (Active)        Opportunity for questions and clarification was provided.       Patient transported with:  Registered Nurse

## 2023-01-19 NOTE — PROGRESS NOTES
Discharge Same Day as PCI Teaching    Discharge teaching completed. Patient instructed on wrist site care, including symptoms to report to MD, medication changes & Follow up Care to include:    Patient is being treated with 2 separate anti-platelet medications including aspirin 81mg & plavix. It is very important that these medications are filled today started tomorrow. Patient instructed on the importance of these medications & that these medications must not be stopped for any reason or without talking to the doctor first.   Patient is also being discharged on a medication for cholesterol management (ie-statin) pravachol and instructed on the importance of continuing this medication as well. Patient received a referral for [facility], contact information was faxed to Cardiac rehab & patient given telephone number to contact ([phone]) Cardiac Rehab if they have not heard from them within 10 days.

## 2023-01-19 NOTE — PROGRESS NOTES
R band deflation completed. right radial dressed with quick clot and tegaderm using sterile technique. No bleeding or hematoma.  Tolerated without difficulty

## 2023-01-21 NOTE — PROGRESS NOTES
SAME DAY DISCHARGE FOLLOW UP PHONE CALL           NAME : Loree Morris           : 1960                    DATE/TIME:   23 [facility])                           MRN# [id number]        1. Ensure that the patient has had their new prescriptions filled & ask if they have taken their anti-platelet & aspirin that day. Pt has taken asa & plavix today as ordered. States he was taking these prior  to admission. Reinforced the importance of continuing to take both these medications daily & to not stop for any reason without discussing with the cardiologist first. Voiced good understanding    2. Ask about access site. Have the patient assess for any signs of a hematoma. Ask about any pain at access site. Right radial site doing well, states a little sore but denies any swelling bleeding or pain      3. Ask the patient if they had experienced any chest pain or shortness of breath.     Reports he has been feeling well, denies any chest pain or shortness of breath

## 2023-01-22 LAB — ECHO BSA: 2.39 M2

## 2023-02-16 ENCOUNTER — OFFICE VISIT (OUTPATIENT)
Dept: CARDIOLOGY CLINIC | Age: 63
End: 2023-02-16

## 2023-02-16 VITALS
BODY MASS INDEX: 33.46 KG/M2 | HEIGHT: 72 IN | SYSTOLIC BLOOD PRESSURE: 130 MMHG | DIASTOLIC BLOOD PRESSURE: 80 MMHG | HEART RATE: 60 BPM | WEIGHT: 247 LBS

## 2023-02-16 DIAGNOSIS — Z98.61 S/P PTCA (PERCUTANEOUS TRANSLUMINAL CORONARY ANGIOPLASTY): Primary | ICD-10-CM

## 2023-02-16 PROCEDURE — 99024 POSTOP FOLLOW-UP VISIT: CPT | Performed by: INTERNAL MEDICINE

## 2023-02-16 RX ORDER — ROSUVASTATIN CALCIUM 20 MG/1
TABLET, COATED ORAL
COMMUNITY
Start: 2023-02-06

## 2023-02-16 RX ORDER — HYDRALAZINE HYDROCHLORIDE 10 MG/1
TABLET, FILM COATED ORAL
COMMUNITY
Start: 2023-02-03

## 2023-02-16 RX ORDER — PANTOPRAZOLE SODIUM 40 MG/1
TABLET, DELAYED RELEASE ORAL
COMMUNITY
Start: 2023-01-17

## 2023-02-16 RX ORDER — NITROGLYCERIN 0.4 MG/1
0.4 TABLET SUBLINGUAL EVERY 5 MIN PRN
Qty: 25 TABLET | Refills: 0 | Status: SHIPPED | OUTPATIENT
Start: 2023-02-16

## 2023-02-16 RX ORDER — PIOGLITAZONEHYDROCHLORIDE 30 MG/1
TABLET ORAL
COMMUNITY

## 2023-02-16 NOTE — PROGRESS NOTES
[facility] CARDIOLOGY  [address], [address]  PHONE: [phone]        23        NAME:  Chaim Maxwell  : 1960  MRN: [id number]     CHIEF COMPLAINT:    No chief complaint on file. SUBJECTIVE:     Palpitation at HS. Fatigue is better since PCI. Medications were all reviewed with the patient today and updated as necessary.    Current Outpatient Medications   Medication Sig    pioglitazone (ACTOS) 30 MG tablet pioglitazone 30 mg tablet    rosuvastatin (CRESTOR) 20 MG tablet TAKE 1 TABLET BY MOUTH EVERY DAY FOR 30 DAYS    pantoprazole (PROTONIX) 40 MG tablet TAKE 1 TABLET BY MOUTH EVERY MORNING 30 MINUTES PRIOR TO BREAKFAST    hydrALAZINE (APRESOLINE) 10 MG tablet TAKE 1 TABLET BY MOUTH TWICE A DAY WITH FOOD FOR 30 DAYS    CREON 07763-45959 units delayed release capsule TAKE 1 CAPSULE 15 MINUTES BEFORE MEALS ORALLY THREE TIMES A DAY 90 DAYS    chlorthalidone (HYGROTON) 25 MG tablet Take 1 tablet by mouth at bedtime    timolol (BLOCADREN) 20 MG tablet Take 1 tablet by mouth 2 times daily    clopidogrel (PLAVIX) 75 MG tablet Take 1 tablet by mouth at bedtime    amLODIPine (NORVASC) 10 MG tablet Take 10 mg by mouth at bedtime TAKE 1 TABLET BY MOUTH EVERY DAY    aspirin 81 MG chewable tablet Take 81 mg by mouth at bedtime    benazepril (LOTENSIN) 40 MG tablet Take 40 mg by mouth at bedtime    Cholecalciferol 50 MCG ( UT) TABS Take by mouth at bedtime    clotrimazole-betamethasone (LOTRISONE) 7-0.57 % cream 1 application 2 times daily    colestipol (COLESTID) 1 g tablet Take 1 g by mouth 2 times daily    cyanocobalamin 1000 MCG tablet Take 1,000 mcg by mouth at bedtime    cyanocobalamin 1000 MCG/ML injection 1,000mcg by IM route monthly    dapagliflozin (FARXIGA) 10 MG tablet Take by mouth at bedtime    dicyclomine (BENTYL) 10 MG capsule Take 20 mg by mouth 4 times daily    fenofibrate (TRIGLIDE) 160 MG tablet Take 160 mg by mouth at bedtime    folic acid (FOLVITE) 1 MG tablet Take 1 mg by mouth at bedtime    HYDROcodone-acetaminophen (NORCO) 5-325 MG per tablet Take 1 tablet by mouth every 8 hours as needed. Icosapent Ethyl (VASCEPA) 1 g CAPS capsule Take 2 capsules by mouth 2 times daily (with meals)    Insulin Glargine, 2 Unit Dial, 300 UNIT/ML SOPN Inject 65 Units into the skin in the morning and at bedtime    insulin lispro (HUMALOG KWIKPEN) 200 UNIT/ML SOPN pen Inject 6-10 Units into the skin 3 times daily (before meals)    Magnesium Oxide 500 MG TABS Take 500 mg by mouth at bedtime    metFORMIN (GLUCOPHAGE) 500 MG tablet Take 1,000 mg by mouth 2 times daily (with meals)    nitroGLYCERIN (NITROSTAT) 0.4 MG SL tablet Place 0.4 mg under the tongue    ondansetron (ZOFRAN-ODT) 4 MG disintegrating tablet Take 4 mg by mouth every 8 hours as needed    tiZANidine (ZANAFLEX) 4 MG tablet Take 4 mg by mouth 3 times daily as needed    traZODone (DESYREL) 50 MG tablet Take 50 mg by mouth as needed     No current facility-administered medications for this visit. Allergies   Allergen Reactions    Meloxicam Diarrhea           PHYSICAL EXAM:     Wt Readings from Last 3 Encounters:   02/16/23 247 lb (112 kg)   01/19/23 248 lb (112.5 kg)   01/16/23 248 lb (112.5 kg)     BP Readings from Last 3 Encounters:   02/16/23 130/80   01/19/23 (!) 156/71   01/16/23 (!) 142/70       /80   Pulse 60   Ht 6' (1.829 m)   Wt 247 lb (112 kg)   BMI 33.50 kg/m²     Physical Exam  Vitals reviewed. HENT:      Head: Normocephalic and atraumatic. Eyes:      Extraocular Movements: Extraocular movements intact. Pupils: Pupils are equal, round, and reactive to light. Cardiovascular:      Rate and Rhythm: Normal rate. Heart sounds: Normal heart sounds. Pulmonary:      Effort: Pulmonary effort is normal.      Breath sounds: Normal breath sounds. Abdominal:      General: Abdomen is flat. Palpations: Abdomen is soft. There is no mass.    Musculoskeletal: General: Normal range of motion. Cervical back: Normal range of motion. Skin:     General: Skin is warm and dry. Neurological:      General: No focal deficit present. Mental Status: He is alert and oriented to person, place, and time. Psychiatric:         Mood and Affect: Mood normal.         RECENT LABS AND RECORDS REVIEW          ASSESSMENT and PLAN    Diagnoses and all orders for this visit:    S/P PTCA (percutaneous transluminal coronary angioplasty)     Return in about 3 months (around 5/16/2023).        Danis Horne MD  2/16/2023  9:54 AM

## 2023-03-24 ENCOUNTER — TELEPHONE (OUTPATIENT)
Dept: CARDIOLOGY CLINIC | Age: 63
End: 2023-03-24

## 2023-03-24 NOTE — TELEPHONE ENCOUNTER
Fax received from [facility] asking for information on patients diagnosis. I faxed pts last office visit note.

## 2023-04-27 ENCOUNTER — HOSPITAL ENCOUNTER (EMERGENCY)
Age: 63
Discharge: HOME | End: 2023-04-27
Attending: EMERGENCY MEDICINE
Payer: COMMERCIAL

## 2023-04-27 ENCOUNTER — APPOINTMENT (OUTPATIENT)
Dept: CT IMAGING | Age: 63
End: 2023-04-27
Payer: COMMERCIAL

## 2023-04-27 VITALS
RESPIRATION RATE: 18 BRPM | HEART RATE: 71 BPM | WEIGHT: 252 LBS | BODY MASS INDEX: 34.13 KG/M2 | HEIGHT: 72 IN | SYSTOLIC BLOOD PRESSURE: 153 MMHG | OXYGEN SATURATION: 98 % | TEMPERATURE: 98.6 F | DIASTOLIC BLOOD PRESSURE: 67 MMHG

## 2023-04-27 DIAGNOSIS — K85.90 ACUTE PANCREATITIS WITHOUT INFECTION OR NECROSIS, UNSPECIFIED PANCREATITIS TYPE: Primary | ICD-10-CM

## 2023-04-27 LAB
ALBUMIN SERPL-MCNC: 4.1 G/DL (ref 3.2–4.6)
ALBUMIN/GLOB SERPL: 1.2 (ref 0.4–1.6)
ALP SERPL-CCNC: 38 U/L (ref 50–136)
ALT SERPL-CCNC: 102 U/L (ref 12–65)
ANION GAP SERPL CALC-SCNC: 5 MMOL/L (ref 2–11)
APPEARANCE UR: CLEAR
AST SERPL-CCNC: 75 U/L (ref 15–37)
BASOPHILS # BLD: 0 K/UL (ref 0–0.2)
BASOPHILS NFR BLD: 1 % (ref 0–2)
BILIRUB SERPL-MCNC: 0.5 MG/DL (ref 0.2–1.1)
BILIRUB UR QL: NEGATIVE
BUN SERPL-MCNC: 19 MG/DL (ref 8–23)
CALCIUM SERPL-MCNC: 9.6 MG/DL (ref 8.3–10.4)
CHLORIDE SERPL-SCNC: 105 MMOL/L (ref 101–110)
CO2 SERPL-SCNC: 24 MMOL/L (ref 21–32)
COLOR UR: NORMAL
CREAT SERPL-MCNC: 0.9 MG/DL (ref 0.8–1.5)
DIFFERENTIAL METHOD BLD: NORMAL
EOSINOPHIL # BLD: 0.1 K/UL (ref 0–0.8)
EOSINOPHIL NFR BLD: 1 % (ref 0.5–7.8)
ERYTHROCYTE [DISTWIDTH] IN BLOOD BY AUTOMATED COUNT: 14.3 % (ref 11.9–14.6)
GLOBULIN SER CALC-MCNC: 3.4 G/DL (ref 2.8–4.5)
GLUCOSE SERPL-MCNC: 197 MG/DL (ref 65–100)
GLUCOSE UR STRIP.AUTO-MCNC: >1000 MG/DL
HCT VFR BLD AUTO: 47.8 % (ref 41.1–50.3)
HGB BLD-MCNC: 15.6 G/DL (ref 13.6–17.2)
HGB UR QL STRIP: NEGATIVE
IMM GRANULOCYTES # BLD AUTO: 0 K/UL (ref 0–0.5)
IMM GRANULOCYTES NFR BLD AUTO: 0 % (ref 0–5)
KETONES UR QL STRIP.AUTO: NEGATIVE MG/DL
LEUKOCYTE ESTERASE UR QL STRIP.AUTO: NEGATIVE
LIPASE SERPL-CCNC: 1317 U/L (ref 73–393)
LYMPHOCYTES # BLD: 1.2 K/UL (ref 0.5–4.6)
LYMPHOCYTES NFR BLD: 19 % (ref 13–44)
MCH RBC QN AUTO: 28.4 PG (ref 26.1–32.9)
MCHC RBC AUTO-ENTMCNC: 32.6 G/DL (ref 31.4–35)
MCV RBC AUTO: 87.1 FL (ref 82–102)
MONOCYTES # BLD: 0.5 K/UL (ref 0.1–1.3)
MONOCYTES NFR BLD: 9 % (ref 4–12)
NEUTS SEG # BLD: 4.3 K/UL (ref 1.7–8.2)
NEUTS SEG NFR BLD: 70 % (ref 43–78)
NITRITE UR QL STRIP.AUTO: NEGATIVE
NRBC # BLD: 0 K/UL (ref 0–0.2)
PH UR STRIP: 6.5 (ref 5–9)
PLATELET # BLD AUTO: 228 K/UL (ref 150–450)
PMV BLD AUTO: 9.4 FL (ref 9.4–12.3)
POTASSIUM SERPL-SCNC: 3.9 MMOL/L (ref 3.5–5.1)
PROT SERPL-MCNC: 7.5 G/DL (ref 6.3–8.2)
PROT UR STRIP-MCNC: NEGATIVE MG/DL
RBC # BLD AUTO: 5.49 M/UL (ref 4.23–5.6)
SODIUM SERPL-SCNC: 134 MMOL/L (ref 133–143)
SP GR UR REFRACTOMETRY: 1.02 (ref 1–1.02)
UROBILINOGEN UR QL STRIP.AUTO: 0.2 EU/DL (ref 0.2–1)
WBC # BLD AUTO: 6.1 K/UL (ref 4.3–11.1)

## 2023-04-27 PROCEDURE — 99284 EMERGENCY DEPT VISIT MOD MDM: CPT

## 2023-04-27 PROCEDURE — 96374 THER/PROPH/DIAG INJ IV PUSH: CPT

## 2023-04-27 PROCEDURE — 96376 TX/PRO/DX INJ SAME DRUG ADON: CPT

## 2023-04-27 PROCEDURE — 83690 ASSAY OF LIPASE: CPT

## 2023-04-27 PROCEDURE — 96375 TX/PRO/DX INJ NEW DRUG ADDON: CPT

## 2023-04-27 PROCEDURE — 81003 URINALYSIS AUTO W/O SCOPE: CPT

## 2023-04-27 PROCEDURE — 74176 CT ABD & PELVIS W/O CONTRAST: CPT

## 2023-04-27 PROCEDURE — 80053 COMPREHEN METABOLIC PANEL: CPT

## 2023-04-27 PROCEDURE — 85025 COMPLETE CBC W/AUTO DIFF WBC: CPT

## 2023-04-27 PROCEDURE — 2580000003 HC RX 258

## 2023-04-27 PROCEDURE — 6360000002 HC RX W HCPCS

## 2023-04-27 RX ORDER — HYDROCODONE BITARTRATE AND ACETAMINOPHEN 10; 325 MG/1; MG/1
1 TABLET ORAL EVERY 6 HOURS PRN
Qty: 20 TABLET | Refills: 0 | Status: SHIPPED | OUTPATIENT
Start: 2023-04-27 | End: 2023-05-04

## 2023-04-27 RX ORDER — HYDROMORPHONE HYDROCHLORIDE 1 MG/ML
0.5 INJECTION, SOLUTION INTRAMUSCULAR; INTRAVENOUS; SUBCUTANEOUS
Status: COMPLETED | OUTPATIENT
Start: 2023-04-27 | End: 2023-04-27

## 2023-04-27 RX ORDER — MORPHINE SULFATE 4 MG/ML
4 INJECTION, SOLUTION INTRAMUSCULAR; INTRAVENOUS
Status: COMPLETED | OUTPATIENT
Start: 2023-04-27 | End: 2023-04-27

## 2023-04-27 RX ORDER — SODIUM CHLORIDE, SODIUM LACTATE, POTASSIUM CHLORIDE, AND CALCIUM CHLORIDE .6; .31; .03; .02 G/100ML; G/100ML; G/100ML; G/100ML
1000 INJECTION, SOLUTION INTRAVENOUS ONCE
Status: COMPLETED | OUTPATIENT
Start: 2023-04-27 | End: 2023-04-27

## 2023-04-27 RX ORDER — ONDANSETRON 2 MG/ML
4 INJECTION INTRAMUSCULAR; INTRAVENOUS ONCE
Status: COMPLETED | OUTPATIENT
Start: 2023-04-27 | End: 2023-04-27

## 2023-04-27 RX ADMIN — ONDANSETRON 4 MG: 2 INJECTION INTRAMUSCULAR; INTRAVENOUS at 16:07

## 2023-04-27 RX ADMIN — SODIUM CHLORIDE, POTASSIUM CHLORIDE, SODIUM LACTATE AND CALCIUM CHLORIDE 1000 ML: 600; 310; 30; 20 INJECTION, SOLUTION INTRAVENOUS at 16:08

## 2023-04-27 RX ADMIN — HYDROMORPHONE HYDROCHLORIDE 0.5 MG: 1 INJECTION, SOLUTION INTRAMUSCULAR; INTRAVENOUS; SUBCUTANEOUS at 18:35

## 2023-04-27 RX ADMIN — HYDROMORPHONE HYDROCHLORIDE 0.5 MG: 1 INJECTION, SOLUTION INTRAMUSCULAR; INTRAVENOUS; SUBCUTANEOUS at 17:35

## 2023-04-27 RX ADMIN — MORPHINE SULFATE 4 MG: 4 INJECTION INTRAVENOUS at 16:07

## 2023-04-27 ASSESSMENT — ENCOUNTER SYMPTOMS
NAUSEA: 1
BACK PAIN: 1
COLOR CHANGE: 0
DIARRHEA: 0
CHEST TIGHTNESS: 0
SHORTNESS OF BREATH: 0
ABDOMINAL PAIN: 1
VOMITING: 1
WHEEZING: 0

## 2023-04-27 ASSESSMENT — PAIN SCALES - GENERAL: PAINLEVEL_OUTOF10: 7

## 2023-04-27 ASSESSMENT — PAIN - FUNCTIONAL ASSESSMENT: PAIN_FUNCTIONAL_ASSESSMENT: 0-10

## 2023-04-27 NOTE — ED TRIAGE NOTES
Pt ambulatory  to triage reports R flank/lower back pain that started 3 days ago, w/ n/v/d. Pt took pain medicine at home but is not helping pain management.

## 2023-04-27 NOTE — ED PROVIDER NOTES
TWICE A DAY WITH FOOD FOR 30 DAYSHistorical Med      nitroGLYCERIN (NITROSTAT) 0.4 MG SL tablet Place 1 tablet under the tongue every 5 minutes as needed for Chest pain, Disp-25 tablet, R-0Normal      CREON 95951-21075 units delayed release capsule TAKE 1 CAPSULE 15 MINUTES BEFORE MEALS ORALLY THREE TIMES A DAY 90 DAYS, DAWHistorical Med      chlorthalidone (HYGROTON) 25 MG tablet Take 1 tablet by mouth at bedtime, Disp-90 tablet, R-3Normal      timolol (BLOCADREN) 20 MG tablet Take 1 tablet by mouth 2 times daily, Disp-180 tablet, R-3Normal      clopidogrel (PLAVIX) 75 MG tablet Take 1 tablet by mouth at bedtime, Disp-90 tablet, R-3Normal      amLODIPine (NORVASC) 10 MG tablet Take 10 mg by mouth at bedtime TAKE 1 TABLET BY MOUTH EVERY DAYHistorical Med      aspirin 81 MG chewable tablet Take 81 mg by mouth at bedtimeHistorical Med      benazepril (LOTENSIN) 40 MG tablet Take 40 mg by mouth at bedtimeHistorical Med      Cholecalciferol 50 MCG (2000 UT) TABS Take by mouth at bedtimeHistorical Med      clotrimazole-betamethasone (LOTRISONE) 6-0.17 % cream 1 application 2 times daily, Historical Med      colestipol (COLESTID) 1 g tablet Take 1 g by mouth 2 times dailyHistorical Med      cyanocobalamin 1000 MCG tablet Take 1,000 mcg by mouth at bedtimeHistorical Med      cyanocobalamin 1000 MCG/ML injection 1,000mcg by IM route monthlyHistorical Med      dapagliflozin (FARXIGA) 10 MG tablet Take by mouth at bedtimeHistorical Med      dicyclomine (BENTYL) 10 MG capsule Take 20 mg by mouth 4 times dailyHistorical Med      fenofibrate (TRIGLIDE) 160 MG tablet Take 160 mg by mouth at bedtimeHistorical Med      folic acid (FOLVITE) 1 MG tablet Take 1 mg by mouth at bedtimeHistorical Med      Icosapent Ethyl (VASCEPA) 1 g CAPS capsule Take 2 capsules by mouth 2 times daily (with meals)Historical Med      Insulin Glargine, 2 Unit Dial, 300 UNIT/ML SOPN Inject 65 Units into the skin in the morning and at bedtimeHistorical Med

## 2023-04-27 NOTE — DISCHARGE INSTRUCTIONS
Your pancreas enzyme (lipase) was elevated at 1300, suggestive of acute pancreatitis. Other labs reassuring. CT reassuring. You were given some pain medication and fluids in the emergency department. Managing pancreatitis at home involves adequate pain and diet control. Make sure you adhere to a liquid-only diet until your pain is improved. Plan to follow-up with your primary doctor early next week. Please return with any worsening symptoms or concerns in the interim. Hope you get to feeling better!

## 2023-05-15 NOTE — PROGRESS NOTES
[facility] [[email or web address]] CARDIOLOGY  [address] [facility], [address]  PHONE: [phone]        23        NAME:  Tai Moreno  : 1960  MRN: [id number]     CHIEF COMPLAINT:    Coronary Artery Disease         SUBJECTIVE:     Recent ER visit for abdominal pain and dx pancreatitis given. Lipase 1317. Better with diet. Medications were all reviewed with the patient today and updated as necessary.    Current Outpatient Medications   Medication Sig    pioglitazone (ACTOS) 30 MG tablet pioglitazone 30 mg tablet    rosuvastatin (CRESTOR) 20 MG tablet TAKE 1 TABLET BY MOUTH EVERY DAY FOR 30 DAYS    pantoprazole (PROTONIX) 40 MG tablet TAKE 1 TABLET BY MOUTH EVERY MORNING 30 MINUTES PRIOR TO BREAKFAST    hydrALAZINE (APRESOLINE) 10 MG tablet TAKE 1 TABLET BY MOUTH TWICE A DAY WITH FOOD FOR 30 DAYS    nitroGLYCERIN (NITROSTAT) 0.4 MG SL tablet Place 1 tablet under the tongue every 5 minutes as needed for Chest pain    CREON 06756-52467 units delayed release capsule TAKE 1 CAPSULE 15 MINUTES BEFORE MEALS ORALLY THREE TIMES A DAY 90 DAYS    chlorthalidone (HYGROTON) 25 MG tablet Take 1 tablet by mouth at bedtime    clopidogrel (PLAVIX) 75 MG tablet Take 1 tablet by mouth at bedtime    amLODIPine (NORVASC) 10 MG tablet Take 1 tablet by mouth at bedtime TAKE 1 TABLET BY MOUTH EVERY DAY    aspirin 81 MG chewable tablet Take 1 tablet by mouth at bedtime    benazepril (LOTENSIN) 40 MG tablet Take 1 tablet by mouth at bedtime    Cholecalciferol 50 MCG ( UT) TABS Take by mouth at bedtime    clotrimazole-betamethasone (LOTRISONE) 0-7.48 % cream 1 application 2 times daily    colestipol (COLESTID) 1 g tablet Take 1 tablet by mouth 2 times daily    cyanocobalamin 1000 MCG tablet Take 1 tablet by mouth at bedtime    cyanocobalamin 1000 MCG/ML injection 1,000mcg by IM route monthly    dapagliflozin (FARXIGA) 10 MG tablet Take by mouth at bedtime    dicyclomine (BENTYL) 10 MG capsule Take 2 capsules by

## 2023-05-16 ENCOUNTER — OFFICE VISIT (OUTPATIENT)
Age: 63
End: 2023-05-16
Payer: COMMERCIAL

## 2023-05-16 VITALS
SYSTOLIC BLOOD PRESSURE: 130 MMHG | WEIGHT: 243 LBS | HEART RATE: 72 BPM | BODY MASS INDEX: 32.91 KG/M2 | HEIGHT: 72 IN | DIASTOLIC BLOOD PRESSURE: 80 MMHG

## 2023-05-16 DIAGNOSIS — I10 PRIMARY HYPERTENSION: ICD-10-CM

## 2023-05-16 DIAGNOSIS — I25.10 ASCVD (ARTERIOSCLEROTIC CARDIOVASCULAR DISEASE): Primary | ICD-10-CM

## 2023-05-16 PROCEDURE — 99214 OFFICE O/P EST MOD 30 MIN: CPT | Performed by: INTERNAL MEDICINE

## 2023-05-16 PROCEDURE — 3075F SYST BP GE 130 - 139MM HG: CPT | Performed by: INTERNAL MEDICINE

## 2023-05-16 PROCEDURE — 3079F DIAST BP 80-89 MM HG: CPT | Performed by: INTERNAL MEDICINE

## 2023-07-11 ENCOUNTER — TELEPHONE (OUTPATIENT)
Age: 63
End: 2023-07-11

## 2023-07-11 NOTE — TELEPHONE ENCOUNTER
Patient having EUS with Dr. Rach Oliveira on 08/10/23.  Requesting plavix hold for 5 days prior and up to 14 days post. Fax: [phone]

## 2023-07-17 ENCOUNTER — OFFICE VISIT (OUTPATIENT)
Dept: ORTHOPEDIC SURGERY | Age: 63
End: 2023-07-17
Payer: COMMERCIAL

## 2023-07-17 VITALS — WEIGHT: 243 LBS | BODY MASS INDEX: 32.91 KG/M2 | HEIGHT: 72 IN

## 2023-07-17 DIAGNOSIS — M54.50 LUMBAR PAIN: Primary | ICD-10-CM

## 2023-07-17 DIAGNOSIS — M54.16 LUMBAR RADICULOPATHY: ICD-10-CM

## 2023-07-17 DIAGNOSIS — M47.816 LUMBAR FACET ARTHROPATHY: ICD-10-CM

## 2023-07-17 DIAGNOSIS — M51.36 LUMBAR DEGENERATIVE DISC DISEASE: ICD-10-CM

## 2023-07-17 PROCEDURE — 99204 OFFICE O/P NEW MOD 45 MIN: CPT | Performed by: NURSE PRACTITIONER

## 2023-07-17 RX ORDER — METHOCARBAMOL 750 MG/1
750 TABLET, FILM COATED ORAL 3 TIMES DAILY PRN
Qty: 30 TABLET | Refills: 0 | Status: SHIPPED | OUTPATIENT
Start: 2023-07-17 | End: 2023-07-27

## 2023-07-17 RX ORDER — TRAMADOL HYDROCHLORIDE 50 MG/1
50 TABLET ORAL EVERY 6 HOURS PRN
Qty: 20 TABLET | Refills: 0 | Status: SHIPPED | OUTPATIENT
Start: 2023-07-17 | End: 2023-07-22

## 2023-07-17 RX ORDER — ALPRAZOLAM 0.5 MG/1
0.5 TABLET ORAL ONCE
Qty: 1 TABLET | Refills: 0 | Status: SHIPPED | OUTPATIENT
Start: 2023-07-17 | End: 2023-07-17

## 2023-07-17 RX ORDER — ATORVASTATIN CALCIUM 10 MG/1
TABLET, FILM COATED ORAL
COMMUNITY
Start: 2023-05-25

## 2023-07-17 NOTE — PROGRESS NOTES
Radiographic Studies:     AP, lateral and spot views of the lumbar spine: There is lateral osteophyte spurring. There is disc degeneration at L4 -5. Lower lumbar facet arthropathy. No significant hip OA. No fractures or lesions. Interpretation: Degenerative disc disease and lower lumbar spondylosis      MRI of Lumbar spine images independently reviewed:   Lower lumbar facet arthropathy with mild foraminal narrowing at L4 and L5 in 2020      Assessment/Plan:       Diagnosis Orders   1. Lumbar pain  XR LUMBAR SPINE (2-3 VIEWS)    MRI LUMBAR SPINE WO CONTRAST      2. Lumbar degenerative disc disease  MRI LUMBAR SPINE WO CONTRAST      3. Lumbar radiculopathy  MRI LUMBAR SPINE WO CONTRAST    traMADol (ULTRAM) 50 MG tablet    ALPRAZolam (XANAX) 0.5 MG tablet      4. Lumbar facet arthropathy  MRI LUMBAR SPINE WO CONTRAST          This patient's clinical history and physical exam is consistent with a left  L-5 lumbar radiculopathy. She has new acute L5 radiculopathy. I suspect this may be a potential disc herniation or foraminal stenosis. He is exhausted conservative treatment. At this point I would like to pursue an up-to-date MRI of the lumbar spine. We briefly discussed injections. I will give him Robaxin and tramadol for symptomatic relief. We discussed the natural history of lumbar radiculopathy in that many of these patients have near complete resolution of their symptoms within eight to twelve weeks with conservative care. We discussed that conservative treatments typically start with activity modification, and medication followed by physical therapy as symptoms allow. Oral and/or epidural steroids are other options. I also discussed potential surgical options if the symptoms fail to improve or there is a progressive neurologic deficit and conservative management has been exhausted.   We discussed that surgery is not typically a reliable treatment for isolated back pain, but is usually very

## 2023-08-01 ENCOUNTER — OFFICE VISIT (OUTPATIENT)
Dept: ORTHOPEDIC SURGERY | Age: 63
End: 2023-08-01
Payer: COMMERCIAL

## 2023-08-01 DIAGNOSIS — M54.16 LUMBAR RADICULOPATHY: ICD-10-CM

## 2023-08-01 DIAGNOSIS — M48.061 STENOSIS OF LATERAL RECESS OF LUMBAR SPINE: Primary | ICD-10-CM

## 2023-08-01 PROCEDURE — 99214 OFFICE O/P EST MOD 30 MIN: CPT | Performed by: NURSE PRACTITIONER

## 2023-08-01 RX ORDER — TRAMADOL HYDROCHLORIDE 50 MG/1
50 TABLET ORAL EVERY 6 HOURS PRN
Qty: 28 TABLET | Refills: 0 | Status: SHIPPED | OUTPATIENT
Start: 2023-08-01 | End: 2023-08-08

## 2023-08-01 NOTE — PROGRESS NOTES
Injection Authorization - Spine           I personally reviewed patient's MRI with him. We reviewed his images from 2020 and also his recent MRI. There is a slight increase in the disc protrusion at L4-5 now causing some abutment of the left L5 nerve root. This is the obvious source of his acute onset of pain. I told him that it does not warrant surgical intervention at this time but I would recommend interventional management. He would like to pursue this. Patient will be referred for a left L5 selective nerve root block. He is going to stay on his Plavix. He is a diabetic and aware that we will increase his blood sugars. Today have also refilled his tramadol    - Opioid: The patient was prescribed an oral pain medication. The patient understands that this is a temporary measure to bring acute or post-surgical pain under control and that it is out of the scope of this practice to continue opiates on a long-term basis. The [place] Controlled Substance database was reviewed prior to prescribing.   - Injection: The patient will be referred for a lumbar steroid injection to help reduce the symptoms. The patient understands the risks including hyperglycemia, immunosuppression, meningitis, cerebrospinal fluid leak, epidural hematoma, and reaction to medication. The patient may benefit from additional injections depending on the response. The injection should be a left L5 SNRB       Orders Placed This Encounter   Medications    traMADol (ULTRAM) 50 MG tablet     Sig: Take 1 tablet by mouth every 6 hours as needed for Pain for up to 7 days. Intended supply: 7 days.  Take lowest dose possible to manage pain Max Daily Amount: 200 mg     Dispense:  28 tablet     Refill:  0     Reduce doses taken as pain becomes manageable Reduce doses taken as pain becomes manageable        Orders Placed This Encounter   Procedures    Injection Authorization - Spine        4 This is a chronic illness/condition with exacerbation

## 2023-08-11 ENCOUNTER — OFFICE VISIT (OUTPATIENT)
Dept: ORTHOPEDIC SURGERY | Age: 63
End: 2023-08-11

## 2023-08-11 DIAGNOSIS — M54.16 LUMBAR RADICULOPATHY: Primary | ICD-10-CM

## 2023-08-11 RX ORDER — TRIAMCINOLONE ACETONIDE 40 MG/ML
80 INJECTION, SUSPENSION INTRA-ARTICULAR; INTRAMUSCULAR ONCE
Status: COMPLETED | OUTPATIENT
Start: 2023-08-11 | End: 2023-08-11

## 2023-08-11 RX ADMIN — TRIAMCINOLONE ACETONIDE 80 MG: 40 INJECTION, SUSPENSION INTRA-ARTICULAR; INTRAMUSCULAR at 11:34

## 2023-08-11 NOTE — PROGRESS NOTES
Date: 08/11/23   Name: Daniel Walsh    Pre-Procedural Diagnosis:    Diagnosis Orders   1. Lumbar radiculopathy  FL NERVE BLOCK LUMBOSACRAL 1ST    triamcinolone acetonide (KENALOG-40) injection 80 mg          Procedure: Selective Nerve Root Blocks (Transforaminal) - Single Level    Precautions: [facility] Precautions spine injections: None. Patient denies any prior sensitivity to steroid, local anesthetic, contrast dye, iodine or shellfish. The procedure was discussed at length with the patient and informed consent was signed. The patient was placed in a prone position on the fluoroscopy table and the skin was prepped and draped in a routine sterile fashion. The areas to be injected was/were anesthetized with approximately 5 cc of 1% Lidocaine. A 22-gauge 3.5 inch spinal needle was carefully advanced under fluoroscopic guidance to the left L5 transforaminal space(s). At this time 0.25 cc of omnipaque administered. Once proper placement was confirmed, 2 cc of 0.25% Marcaine and 80 mg of Kenalog were injected through the spinal needle at that/each site. Fluoroscopic guidance was used intermittently over a 10-minute period to insure proper needle placement and patient safety. A hard copy of the fluoroscopic  images has been placed in the patient's chart. The patient was monitored after the procedure and discharged home in stable fashion. A total of 2 cc of Kenalog were administered during this procedure. Resume Meds:  Remains on asa and plavix.         Rajesh Ibarra MD  08/11/23

## 2023-08-28 ENCOUNTER — OFFICE VISIT (OUTPATIENT)
Dept: ORTHOPEDIC SURGERY | Age: 63
End: 2023-08-28
Payer: COMMERCIAL

## 2023-08-28 DIAGNOSIS — M48.061 STENOSIS OF LATERAL RECESS OF LUMBAR SPINE: Primary | ICD-10-CM

## 2023-08-28 DIAGNOSIS — M54.16 LUMBAR RADICULOPATHY: ICD-10-CM

## 2023-08-28 DIAGNOSIS — M47.816 LUMBAR FACET ARTHROPATHY: ICD-10-CM

## 2023-08-28 PROCEDURE — 99213 OFFICE O/P EST LOW 20 MIN: CPT | Performed by: NURSE PRACTITIONER

## 2023-08-28 NOTE — PROGRESS NOTES
clopidogrel (PLAVIX) 75 MG tablet, Take 1 tablet by mouth at bedtime, Disp: 90 tablet, Rfl: 3    amLODIPine (NORVASC) 10 MG tablet, Take 1 tablet by mouth at bedtime TAKE 1 TABLET BY MOUTH EVERY DAY, Disp: , Rfl:     aspirin 81 MG chewable tablet, Take 1 tablet by mouth at bedtime, Disp: , Rfl:     benazepril (LOTENSIN) 40 MG tablet, Take 1 tablet by mouth at bedtime, Disp: , Rfl:     Cholecalciferol 50 MCG (2000 UT) TABS, Take by mouth at bedtime, Disp: , Rfl:     clotrimazole-betamethasone (LOTRISONE) 8-8.66 % cream, 1 application 2 times daily, Disp: , Rfl:     colestipol (COLESTID) 1 g tablet, Take 1 tablet by mouth 2 times daily, Disp: , Rfl:     cyanocobalamin 1000 MCG tablet, Take 1 tablet by mouth at bedtime, Disp: , Rfl:     cyanocobalamin 1000 MCG/ML injection, 1,000mcg by IM route monthly, Disp: , Rfl:     dapagliflozin (FARXIGA) 10 MG tablet, Take by mouth at bedtime, Disp: , Rfl:     dicyclomine (BENTYL) 10 MG capsule, Take 2 capsules by mouth 4 times daily, Disp: , Rfl:     fenofibrate (TRIGLIDE) 160 MG tablet, Take 1 tablet by mouth at bedtime, Disp: , Rfl:     folic acid (FOLVITE) 1 MG tablet, Take 1 tablet by mouth at bedtime, Disp: , Rfl:     Icosapent Ethyl (VASCEPA) 1 g CAPS capsule, Take 2 capsules by mouth 2 times daily (with meals), Disp: , Rfl:     Insulin Glargine, 2 Unit Dial, 300 UNIT/ML SOPN, Inject 65 Units into the skin in the morning and at bedtime, Disp: , Rfl:     insulin lispro (HUMALOG KWIKPEN) 200 UNIT/ML SOPN pen, Inject 6-10 Units into the skin 3 times daily (before meals), Disp: , Rfl:     Magnesium Oxide 500 MG TABS, Take 500 mg by mouth at bedtime, Disp: , Rfl:     metFORMIN (GLUCOPHAGE) 500 MG tablet, Take 2 tablets by mouth 2 times daily (with meals), Disp: , Rfl:     ondansetron (ZOFRAN-ODT) 4 MG disintegrating tablet, Take 1 tablet by mouth every 8 hours as needed, Disp: , Rfl:     tiZANidine (ZANAFLEX) 4 MG tablet, Take 1 tablet by mouth 3 times daily as needed, Disp: ,

## 2023-11-13 ENCOUNTER — TELEPHONE (OUTPATIENT)
Dept: ORTHOPEDIC SURGERY | Age: 63
End: 2023-11-13

## 2023-11-13 NOTE — TELEPHONE ENCOUNTER
He is requesting a refill on hydrocodone to [facility] in [place]. Tramadol does not work for him.

## 2023-11-14 NOTE — TELEPHONE ENCOUNTER
Spoke with wife Sean Gu and informed her we do not do opiate management . Option would be to set him up for injection or get him referred over to a pain mgmt that is able to do medication management and injections. Sean Gu stated she would speak with pcp about medication they want to hold off on any injections at this time.

## 2023-11-30 ENCOUNTER — OFFICE VISIT (OUTPATIENT)
Age: 63
End: 2023-11-30
Payer: COMMERCIAL

## 2023-11-30 VITALS
HEIGHT: 72 IN | HEART RATE: 66 BPM | DIASTOLIC BLOOD PRESSURE: 60 MMHG | BODY MASS INDEX: 34.95 KG/M2 | WEIGHT: 258 LBS | SYSTOLIC BLOOD PRESSURE: 126 MMHG | OXYGEN SATURATION: 98 %

## 2023-11-30 DIAGNOSIS — I25.10 ASCVD (ARTERIOSCLEROTIC CARDIOVASCULAR DISEASE): Primary | ICD-10-CM

## 2023-11-30 DIAGNOSIS — I10 PRIMARY HYPERTENSION: ICD-10-CM

## 2023-11-30 PROCEDURE — 3078F DIAST BP <80 MM HG: CPT | Performed by: INTERNAL MEDICINE

## 2023-11-30 PROCEDURE — 99214 OFFICE O/P EST MOD 30 MIN: CPT | Performed by: INTERNAL MEDICINE

## 2023-11-30 PROCEDURE — 3074F SYST BP LT 130 MM HG: CPT | Performed by: INTERNAL MEDICINE

## 2023-11-30 NOTE — PROGRESS NOTES
[facility] CARDIOLOGY  [address] [address]  PHONE: [phone]        23        NAME:  Marilee Brantley  : 1960  MRN: [id number]     1. Ischemic heart disease  2. Type 2 diabetes mellitus with hyperglycemia, without long-term current use of insulin (HCC)  3. Primary hypertension  4. Pancreatitis, recurrent    CHIEF COMPLAINT:    Hypertension      SUBJECTIVE:       No chest pain or palpitation or dizziness. Medications were all reviewed with the patient today and updated as necessary.    Current Outpatient Medications   Medication Sig    atorvastatin (LIPITOR) 10 MG tablet TAKE 1 TABLET BY MOUTH EVERY DAY FOR 30 DAYS    pioglitazone (ACTOS) 30 MG tablet pioglitazone 30 mg tablet    pantoprazole (PROTONIX) 40 MG tablet TAKE 1 TABLET BY MOUTH EVERY MORNING 30 MINUTES PRIOR TO BREAKFAST    hydrALAZINE (APRESOLINE) 10 MG tablet TAKE 1 TABLET BY MOUTH TWICE A DAY WITH FOOD FOR 30 DAYS    nitroGLYCERIN (NITROSTAT) 0.4 MG SL tablet Place 1 tablet under the tongue every 5 minutes as needed for Chest pain    CREON 83576-19422 units delayed release capsule TAKE 1 CAPSULE 15 MINUTES BEFORE MEALS ORALLY THREE TIMES A DAY 90 DAYS    chlorthalidone (HYGROTON) 25 MG tablet Take 1 tablet by mouth at bedtime    timolol (BLOCADREN) 20 MG tablet Take 1 tablet by mouth 2 times daily    clopidogrel (PLAVIX) 75 MG tablet Take 1 tablet by mouth at bedtime    amLODIPine (NORVASC) 10 MG tablet Take 1 tablet by mouth at bedtime TAKE 1 TABLET BY MOUTH EVERY DAY    aspirin 81 MG chewable tablet Take 1 tablet by mouth at bedtime    benazepril (LOTENSIN) 40 MG tablet Take 1 tablet by mouth at bedtime    Cholecalciferol 50 MCG ( UT) TABS Take by mouth at bedtime    clotrimazole-betamethasone (LOTRISONE) 6-1.95 % cream 1 application 2 times daily    colestipol (COLESTID) 1 g tablet Take 1 tablet by mouth 2 times daily    cyanocobalamin 1000 MCG tablet Take 1 tablet by mouth at bedtime

## 2024-01-29 ENCOUNTER — OFFICE VISIT (OUTPATIENT)
Age: 64
End: 2024-01-29
Payer: COMMERCIAL

## 2024-01-29 VITALS
HEIGHT: 72 IN | WEIGHT: 256 LBS | BODY MASS INDEX: 34.67 KG/M2 | HEART RATE: 72 BPM | SYSTOLIC BLOOD PRESSURE: 150 MMHG | DIASTOLIC BLOOD PRESSURE: 60 MMHG

## 2024-01-29 DIAGNOSIS — I10 PRIMARY HYPERTENSION: ICD-10-CM

## 2024-01-29 DIAGNOSIS — Z79.4 TYPE 2 DIABETES MELLITUS WITH HYPERGLYCEMIA, WITH LONG-TERM CURRENT USE OF INSULIN (HCC): ICD-10-CM

## 2024-01-29 DIAGNOSIS — E11.65 TYPE 2 DIABETES MELLITUS WITH HYPERGLYCEMIA, WITH LONG-TERM CURRENT USE OF INSULIN (HCC): ICD-10-CM

## 2024-01-29 DIAGNOSIS — I25.10 ASCVD (ARTERIOSCLEROTIC CARDIOVASCULAR DISEASE): Primary | ICD-10-CM

## 2024-01-29 DIAGNOSIS — E78.5 DYSLIPIDEMIA: ICD-10-CM

## 2024-01-29 PROCEDURE — 3078F DIAST BP <80 MM HG: CPT | Performed by: INTERNAL MEDICINE

## 2024-01-29 PROCEDURE — 3077F SYST BP >= 140 MM HG: CPT | Performed by: INTERNAL MEDICINE

## 2024-01-29 PROCEDURE — 99214 OFFICE O/P EST MOD 30 MIN: CPT | Performed by: INTERNAL MEDICINE

## 2024-01-29 RX ORDER — SPIRONOLACTONE 25 MG/1
25 TABLET ORAL DAILY
Qty: 90 TABLET | Refills: 1 | Status: SHIPPED | OUTPATIENT
Start: 2024-01-29

## 2024-01-29 RX ORDER — CHLORTHALIDONE 25 MG/1
25 TABLET ORAL NIGHTLY
Qty: 90 TABLET | Refills: 3 | Status: SHIPPED | OUTPATIENT
Start: 2024-01-29

## 2024-01-29 RX ORDER — CLOPIDOGREL BISULFATE 75 MG/1
75 TABLET ORAL NIGHTLY
Qty: 90 TABLET | Refills: 3 | Status: SHIPPED | OUTPATIENT
Start: 2024-01-29

## 2024-01-29 RX ORDER — NITROGLYCERIN 0.4 MG/1
0.4 TABLET SUBLINGUAL EVERY 5 MIN PRN
Qty: 25 TABLET | Refills: 0 | Status: SHIPPED | OUTPATIENT
Start: 2024-01-29

## 2024-01-29 NOTE — PROGRESS NOTES
mouth at bedtime    cyanocobalamin 1000 MCG/ML injection 1,000mcg by IM route monthly    dapagliflozin (FARXIGA) 10 MG tablet Take by mouth at bedtime    dicyclomine (BENTYL) 10 MG capsule Take 2 capsules by mouth 4 times daily    fenofibrate (TRIGLIDE) 160 MG tablet Take 1 tablet by mouth at bedtime    folic acid (FOLVITE) 1 MG tablet Take 1 tablet by mouth at bedtime    Icosapent Ethyl (VASCEPA) 1 g CAPS capsule Take 2 capsules by mouth 2 times daily (with meals)    Insulin Glargine, 2 Unit Dial, 300 UNIT/ML SOPN Inject 65 Units into the skin in the morning and at bedtime    insulin lispro (HUMALOG KWIKPEN) 200 UNIT/ML SOPN pen Inject 6-10 Units into the skin 3 times daily (before meals)    Magnesium Oxide 500 MG TABS Take 500 mg by mouth at bedtime    metFORMIN (GLUCOPHAGE) 1000 MG tablet Take 1 tablet by mouth 2 times daily (with meals)    ondansetron (ZOFRAN-ODT) 4 MG disintegrating tablet Take 1 tablet by mouth every 8 hours as needed    tiZANidine (ZANAFLEX) 4 MG tablet Take 1 tablet by mouth 3 times daily as needed    traZODone (DESYREL) 50 MG tablet Take 1 tablet by mouth as needed     No current facility-administered medications for this visit.        Allergies   Allergen Reactions    Meloxicam Diarrhea    Omeprazole            PHYSICAL EXAM:     Wt Readings from Last 3 Encounters:   01/29/24 116.1 kg (256 lb)   11/30/23 117 kg (258 lb)   07/17/23 110.2 kg (243 lb)     BP Readings from Last 3 Encounters:   01/29/24 (!) 150/60   11/30/23 126/60   05/16/23 130/80       BP (!) 150/60   Pulse 72   Ht 1.829 m (6')   Wt 116.1 kg (256 lb)   BMI 34.72 kg/m²     Physical Exam  Vitals reviewed.   HENT:      Head: Normocephalic and atraumatic.   Eyes:      Extraocular Movements: Extraocular movements intact.      Pupils: Pupils are equal, round, and reactive to light.   Cardiovascular:      Rate and Rhythm: Normal rate.      Heart sounds: Normal heart sounds.   Pulmonary:      Effort: Pulmonary effort is normal.

## 2024-02-15 DIAGNOSIS — I25.10 ASCVD (ARTERIOSCLEROTIC CARDIOVASCULAR DISEASE): ICD-10-CM

## 2024-02-15 LAB
ANION GAP SERPL CALC-SCNC: 5 MMOL/L (ref 2–11)
BUN SERPL-MCNC: 25 MG/DL (ref 8–23)
CALCIUM SERPL-MCNC: 10 MG/DL (ref 8.3–10.4)
CHLORIDE SERPL-SCNC: 107 MMOL/L (ref 103–113)
CO2 SERPL-SCNC: 24 MMOL/L (ref 21–32)
CREAT SERPL-MCNC: 1.1 MG/DL (ref 0.8–1.5)
GLUCOSE SERPL-MCNC: 120 MG/DL (ref 65–100)
POTASSIUM SERPL-SCNC: 3.9 MMOL/L (ref 3.5–5.1)
SODIUM SERPL-SCNC: 136 MMOL/L (ref 136–146)

## 2024-03-25 RX ORDER — TIMOLOL MALEATE 20 MG/1
20 TABLET ORAL 2 TIMES DAILY
Qty: 180 TABLET | Refills: 3 | Status: SHIPPED | OUTPATIENT
Start: 2024-03-25

## 2024-03-25 NOTE — TELEPHONE ENCOUNTER
“Verified rx in last OV date 01/29/2024. Pharmacy confirmed. Erx as requested”.    Requested Prescriptions     Pending Prescriptions Disp Refills    timolol (BLOCADREN) 20 MG tablet 180 tablet 3     Sig: Take 1 tablet by mouth 2 times daily

## 2024-03-25 NOTE — TELEPHONE ENCOUNTER
MEDICATION REFILL REQUEST      Name of Medication:  timolol   Dose:  20 mg  Frequency:  twice a day   Quantity:    Days' supply:  90      Pharmacy Name/Location:  [facility]

## 2024-04-03 ENCOUNTER — OFFICE VISIT (OUTPATIENT)
Age: 64
End: 2024-04-03
Payer: COMMERCIAL

## 2024-04-03 VITALS
HEIGHT: 72 IN | DIASTOLIC BLOOD PRESSURE: 74 MMHG | HEART RATE: 63 BPM | WEIGHT: 253.4 LBS | SYSTOLIC BLOOD PRESSURE: 132 MMHG | BODY MASS INDEX: 34.32 KG/M2

## 2024-04-03 DIAGNOSIS — I10 PRIMARY HYPERTENSION: ICD-10-CM

## 2024-04-03 DIAGNOSIS — I25.10 ASCVD (ARTERIOSCLEROTIC CARDIOVASCULAR DISEASE): Primary | ICD-10-CM

## 2024-04-03 PROCEDURE — 99214 OFFICE O/P EST MOD 30 MIN: CPT | Performed by: INTERNAL MEDICINE

## 2024-04-03 PROCEDURE — 3078F DIAST BP <80 MM HG: CPT | Performed by: INTERNAL MEDICINE

## 2024-04-03 PROCEDURE — 93000 ELECTROCARDIOGRAM COMPLETE: CPT | Performed by: INTERNAL MEDICINE

## 2024-04-03 PROCEDURE — 3075F SYST BP GE 130 - 139MM HG: CPT | Performed by: INTERNAL MEDICINE

## 2024-04-03 NOTE — PROGRESS NOTES
[facility] CARDIOLOGY  [address] [place] [address], [address]  [place] [address]  PHONE: [phone]        24        NAME:  Michel Downs  : 1960  MRN: [id number]     1. Ischemic heart disease  2. Type 2 diabetes mellitus with hyperglycemia, without long-term current use of insulin (HCC)  3. Primary hypertension  4. Pancreatitis, recurrent  5. Fatty liver    CHIEF COMPLAINT:    Hypertension      SUBJECTIVE:     No chest pain or palpitation or dizziness.       Medications were all reviewed with the patient today and updated as necessary.   Current Outpatient Medications   Medication Sig    timolol (BLOCADREN) 20 MG tablet Take 1 tablet by mouth 2 times daily    chlorthalidone (HYGROTON) 25 MG tablet Take 1 tablet by mouth at bedtime    clopidogrel (PLAVIX) 75 MG tablet Take 1 tablet by mouth at bedtime    nitroGLYCERIN (NITROSTAT) 0.4 MG SL tablet Place 1 tablet under the tongue every 5 minutes as needed for Chest pain    spironolactone (ALDACTONE) 25 MG tablet Take 1 tablet by mouth daily    atorvastatin (LIPITOR) 10 MG tablet TAKE 1 TABLET BY MOUTH EVERY DAY FOR 30 DAYS    pantoprazole (PROTONIX) 40 MG tablet TAKE 1 TABLET BY MOUTH EVERY MORNING 30 MINUTES PRIOR TO BREAKFAST    CREON 60344-58964 units delayed release capsule TAKE 1 CAPSULE 15 MINUTES BEFORE MEALS ORALLY THREE TIMES A DAY 90 DAYS    amLODIPine (NORVASC) 10 MG tablet Take 1 tablet by mouth at bedtime TAKE 1 TABLET BY MOUTH EVERY DAY    aspirin 81 MG chewable tablet Take 1 tablet by mouth at bedtime    benazepril (LOTENSIN) 40 MG tablet Take 1 tablet by mouth at bedtime    Cholecalciferol 50 MCG ( UT) TABS Take by mouth at bedtime    clotrimazole-betamethasone (LOTRISONE) 1-0.05 % cream 1 application 2 times daily    colestipol (COLESTID) 1 g tablet Take 1 tablet by mouth 2 times daily    cyanocobalamin 1000 MCG tablet Take 1 tablet by mouth at bedtime    cyanocobalamin 1000 MCG/ML injection 1,000mcg by IM route monthly

## 2024-05-06 RX ORDER — SPIRONOLACTONE 25 MG/1
25 TABLET ORAL DAILY
Qty: 90 TABLET | Refills: 3 | Status: SHIPPED | OUTPATIENT
Start: 2024-05-06

## 2024-05-06 NOTE — TELEPHONE ENCOUNTER
Requested Prescriptions     Pending Prescriptions Disp Refills    spironolactone (ALDACTONE) 25 MG tablet 90 tablet 3     Sig: Take 1 tablet by mouth daily     Verified rx. Last OV 4/3/24. Erx to pharm on file.

## 2024-05-06 NOTE — TELEPHONE ENCOUNTER
MEDICATION REFILL REQUEST      Name of Medication:  Spironolactone  Dose:  25 mg  Frequency:  QD  Quantity:  90  Days' supply:  90 has to have per insurance        Pharmacy Name/Location:  [facility]-[phone]

## 2024-10-21 ENCOUNTER — OFFICE VISIT (OUTPATIENT)
Age: 64
End: 2024-10-21
Payer: COMMERCIAL

## 2024-10-21 VITALS
SYSTOLIC BLOOD PRESSURE: 138 MMHG | HEART RATE: 72 BPM | DIASTOLIC BLOOD PRESSURE: 70 MMHG | WEIGHT: 252 LBS | BODY MASS INDEX: 34.13 KG/M2 | HEIGHT: 72 IN

## 2024-10-21 DIAGNOSIS — I10 PRIMARY HYPERTENSION: ICD-10-CM

## 2024-10-21 DIAGNOSIS — I25.10 ASCVD (ARTERIOSCLEROTIC CARDIOVASCULAR DISEASE): Primary | ICD-10-CM

## 2024-10-21 PROCEDURE — 3078F DIAST BP <80 MM HG: CPT | Performed by: INTERNAL MEDICINE

## 2024-10-21 PROCEDURE — 3075F SYST BP GE 130 - 139MM HG: CPT | Performed by: INTERNAL MEDICINE

## 2024-10-21 PROCEDURE — 99214 OFFICE O/P EST MOD 30 MIN: CPT | Performed by: INTERNAL MEDICINE

## 2024-10-21 NOTE — PROGRESS NOTES
[facility] CARDIOLOGY  [address] [place] [address], [address]  [place] [address]  PHONE: [phone]        10/21/24        NAME:  Michel Downs  : 1960  MRN: [id number]     1. Ischemic heart disease  2. Type 2 diabetes mellitus with hyperglycemia, without long-term current use of insulin (HCC)  3. Primary hypertension  4. Pancreatitis, recurrent  5. Fatty liver    CHIEF COMPLAINT:    Coronary Artery Disease      SUBJECTIVE:     No chest pain or palpitation or dizziness.  Rx for fatty liver pending.       Medications were all reviewed with the patient today and updated as necessary.   Current Outpatient Medications   Medication Sig    spironolactone (ALDACTONE) 25 MG tablet Take 1 tablet by mouth daily    timolol (BLOCADREN) 20 MG tablet Take 1 tablet by mouth 2 times daily    chlorthalidone (HYGROTON) 25 MG tablet Take 1 tablet by mouth at bedtime    clopidogrel (PLAVIX) 75 MG tablet Take 1 tablet by mouth at bedtime    nitroGLYCERIN (NITROSTAT) 0.4 MG SL tablet Place 1 tablet under the tongue every 5 minutes as needed for Chest pain    atorvastatin (LIPITOR) 10 MG tablet TAKE 1 TABLET BY MOUTH EVERY DAY FOR 30 DAYS    pantoprazole (PROTONIX) 40 MG tablet TAKE 1 TABLET BY MOUTH EVERY MORNING 30 MINUTES PRIOR TO BREAKFAST    CREON 23882-02645 units delayed release capsule TAKE 1 CAPSULE 15 MINUTES BEFORE MEALS ORALLY THREE TIMES A DAY 90 DAYS    amLODIPine (NORVASC) 10 MG tablet Take 1 tablet by mouth at bedtime TAKE 1 TABLET BY MOUTH EVERY DAY    aspirin 81 MG chewable tablet Take 1 tablet by mouth at bedtime    benazepril (LOTENSIN) 40 MG tablet Take 1 tablet by mouth at bedtime    Cholecalciferol 50 MCG (2000) TABS Take by mouth at bedtime    clotrimazole-betamethasone (LOTRISONE) 1-0.05 % cream 1 application 2 times daily    colestipol (COLESTID) 1 g tablet Take 1 tablet by mouth 2 times daily    cyanocobalamin 1000 MCG tablet Take 1 tablet by mouth at bedtime    cyanocobalamin 1000 MCG/ML

## 2025-01-30 NOTE — ANESTHESIA POSTPROCEDURE EVALUATION
Department of Anesthesiology  Postprocedure Note    Patient: Ethan Roth  MRN: [id number]  YOB: 1960  Date of evaluation: 8/18/2022      Procedure Summary     Date: 08/18/22 Room / Location: [facility] ENDO 05 / 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     Jeremy Phase I: Jeremy Score: 10    Jeremy Phase II: Jeremy Score: 8      Anesthesia Post Evaluation    Patient location during evaluation: bedside  Patient participation: complete - patient participated  Level of consciousness: awake and alert  Pain score: 1  Airway patency: patent  Nausea & Vomiting: no vomiting  Complications: no  Cardiovascular status: hemodynamically stable  Respiratory status: acceptable  Hydration status: euvolemic
See above

## 2025-02-10 DIAGNOSIS — I25.10 ASCVD (ARTERIOSCLEROTIC CARDIOVASCULAR DISEASE): ICD-10-CM

## 2025-02-10 RX ORDER — CHLORTHALIDONE 25 MG/1
25 TABLET ORAL NIGHTLY
Qty: 90 TABLET | Refills: 3 | Status: SHIPPED | OUTPATIENT
Start: 2025-02-10

## 2025-02-10 RX ORDER — CLOPIDOGREL BISULFATE 75 MG/1
75 TABLET ORAL NIGHTLY
Qty: 90 TABLET | Refills: 3 | Status: SHIPPED | OUTPATIENT
Start: 2025-02-10

## 2025-02-10 NOTE — TELEPHONE ENCOUNTER
MEDICATION REFILL REQUEST      Name of Medication:  Chlorthalidone   Dose:  25 mg  Frequency:  daily   Quantity:    Days' supply:  90 day       Pharmacy Name/Location:  [facility] [place]    MEDICATION REFILL REQUEST      Name of Medication:  clopidogrel   Dose:  75 mg  Frequency:  daily   Quantity:    Days' supply:  90 day       Pharmacy Name/Location:  [facility]

## 2025-02-10 NOTE — TELEPHONE ENCOUNTER
Requested Prescriptions     Pending Prescriptions Disp Refills    chlorthalidone (HYGROTON) 25 MG tablet 90 tablet 3     Sig: Take 1 tablet by mouth at bedtime    clopidogrel (PLAVIX) 75 MG tablet 90 tablet 3     Sig: Take 1 tablet by mouth at bedtime        Rx verified last OV 10/21/2024.

## 2025-03-03 NOTE — TELEPHONE ENCOUNTER
Requested Prescriptions     Pending Prescriptions Disp Refills    timolol (BLOCADREN) 20 MG tablet [Pharmacy Med Name: TIMOLOL MALEATE 20 MG TABLET] 180 tablet 3     Sig: TAKE 1 TABLET BY MOUTH TWICE A DAY    Verified rx. Last OV 10/21/24. Erx to pharm on file.

## 2025-03-04 RX ORDER — TIMOLOL MALEATE 20 MG/1
20 TABLET ORAL 2 TIMES DAILY
Qty: 180 TABLET | Refills: 3 | Status: SHIPPED | OUTPATIENT
Start: 2025-03-04

## 2025-04-22 NOTE — TELEPHONE ENCOUNTER
Requested Prescriptions     Pending Prescriptions Disp Refills    spironolactone (ALDACTONE) 25 MG tablet 90 tablet 3     Sig: Take 1 tablet by mouth daily     Verified rx. Last OV 10/21/24. Erx to pharm on file.

## 2025-04-25 RX ORDER — SPIRONOLACTONE 25 MG/1
25 TABLET ORAL DAILY
Qty: 90 TABLET | Refills: 3 | Status: SHIPPED | OUTPATIENT
Start: 2025-04-25

## 2025-05-08 ENCOUNTER — TRANSCRIBE ORDERS (OUTPATIENT)
Dept: SCHEDULING | Age: 65
End: 2025-05-08

## 2025-05-08 DIAGNOSIS — K76.9 LIVER LESION: Primary | ICD-10-CM

## 2025-05-08 DIAGNOSIS — R93.89 ABNORMAL PELVIC ULTRASOUND: ICD-10-CM

## 2025-06-23 ENCOUNTER — OFFICE VISIT (OUTPATIENT)
Dept: ORTHOPEDIC SURGERY | Age: 65
End: 2025-06-23

## 2025-06-23 DIAGNOSIS — M75.102 TEAR OF LEFT ROTATOR CUFF, UNSPECIFIED TEAR EXTENT, UNSPECIFIED WHETHER TRAUMATIC: ICD-10-CM

## 2025-06-23 DIAGNOSIS — M75.101 TEAR OF RIGHT ROTATOR CUFF, UNSPECIFIED TEAR EXTENT, UNSPECIFIED WHETHER TRAUMATIC: ICD-10-CM

## 2025-06-23 DIAGNOSIS — M25.511 BILATERAL SHOULDER PAIN, UNSPECIFIED CHRONICITY: Primary | ICD-10-CM

## 2025-06-23 DIAGNOSIS — F40.240 CLAUSTROPHOBIA: Primary | ICD-10-CM

## 2025-06-23 DIAGNOSIS — M25.512 BILATERAL SHOULDER PAIN, UNSPECIFIED CHRONICITY: Primary | ICD-10-CM

## 2025-06-23 RX ORDER — METHYLPREDNISOLONE ACETATE 40 MG/ML
80 INJECTION, SUSPENSION INTRA-ARTICULAR; INTRALESIONAL; INTRAMUSCULAR; SOFT TISSUE ONCE
Status: COMPLETED | OUTPATIENT
Start: 2025-06-23 | End: 2025-06-23

## 2025-06-23 RX ORDER — ALPRAZOLAM 0.5 MG
0.5 TABLET ORAL ONCE AS NEEDED
Qty: 3 TABLET | Refills: 0 | Status: SHIPPED | OUTPATIENT
Start: 2025-06-23 | End: 2025-06-30

## 2025-06-23 RX ADMIN — METHYLPREDNISOLONE ACETATE 80 MG: 40 INJECTION, SUSPENSION INTRA-ARTICULAR; INTRALESIONAL; INTRAMUSCULAR; SOFT TISSUE at 11:32

## 2025-06-23 NOTE — PROGRESS NOTES
Name: Michel Downs  YOB: 1960  Gender: male  MRN: [id number]    CC:   Chief Complaint   Patient presents with    Shoulder Pain     Bilateral shoulder   , bilateral shoulder pain    History of Present Illness  The patient, a 64-year-old male, presents for evaluation of bilateral shoulder pain.    The patient reports experiencing bilateral shoulder pain, with the left shoulder being more severely affected than the right. He is right-handed. The left shoulder pain has been a chronic issue for several years, whereas the right shoulder pain has been present for approximately one year. He describes the pain as acute, particularly when performing overhead activities, and notes persistent digital paresthesia. The pain is localized to the glenohumeral joint and radiates distally. Additionally, he reports hand edema upon waking if he sleeps in an improper position. The pain is severe enough to disrupt his sleep. He does not experience any associated cervicalgia or sensations of crepitus in the shoulders. He has not undergone any magnetic resonance imaging (MRI) or physical therapy for his shoulder issues.    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 He also reports that his blood glucose levels were affected by a previous corticosteroid injection. He has been managing the pain with over-the-counter analgesics, which have led to secondary back problems. He recalls receiving an intra-articular injection in his shoulder several years ago, which provided some relief but was associated with significant discomfort due to the needle contacting the bone.    The patient has been experiencing unilateral tremors since initiating Zepatier therapy for his liver condition.    His current blood glucose level is 102 mg/dL.    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    Allergies   Allergen

## 2025-07-01 ENCOUNTER — OFFICE VISIT (OUTPATIENT)
Age: 65
End: 2025-07-01
Payer: COMMERCIAL

## 2025-07-01 VITALS
HEIGHT: 72 IN | HEART RATE: 68 BPM | BODY MASS INDEX: 34.13 KG/M2 | WEIGHT: 252 LBS | SYSTOLIC BLOOD PRESSURE: 120 MMHG | DIASTOLIC BLOOD PRESSURE: 66 MMHG

## 2025-07-01 DIAGNOSIS — I25.10 ASCVD (ARTERIOSCLEROTIC CARDIOVASCULAR DISEASE): Primary | ICD-10-CM

## 2025-07-01 LAB
CHOLEST SERPL-MCNC: 106 MG/DL (ref 0–200)
HDLC SERPL-MCNC: 32 MG/DL (ref 40–60)
HDLC SERPL: 3.3 (ref 0–5)
LDLC SERPL CALC-MCNC: 31 MG/DL (ref 0–100)
TRIGL SERPL-MCNC: 216 MG/DL (ref 0–150)
VLDLC SERPL CALC-MCNC: 43 MG/DL (ref 6–23)

## 2025-07-01 PROCEDURE — 3078F DIAST BP <80 MM HG: CPT | Performed by: INTERNAL MEDICINE

## 2025-07-01 PROCEDURE — 3074F SYST BP LT 130 MM HG: CPT | Performed by: INTERNAL MEDICINE

## 2025-07-01 PROCEDURE — 99214 OFFICE O/P EST MOD 30 MIN: CPT | Performed by: INTERNAL MEDICINE

## 2025-07-01 RX ORDER — RESMETIROM 80 MG/1
1 TABLET, COATED ORAL DAILY
COMMUNITY

## 2025-07-01 NOTE — PROGRESS NOTES
[facility] CARDIOLOGY  [address] [place] [address], [address]  [place] [address]  PHONE: [phone]        25        NAME:  Michel Downs  : 1960  MRN: [id number]     1. Ischemic heart disease, prior pci  2. Type 2 diabetes mellitus with hyperglycemia, without long-term current use of insulin (HCC)  3. Primary hypertension  4. Pancreatitis, recurrent  5. Fatty liver    CHIEF COMPLAINT:    ASCVD and Hypertension      SUBJECTIVE:     He may need shoulder surgery.    No chest pain or palpitation or dizziness.  Rx for fatty liver has been helpful.       Medications were all reviewed with the patient today and updated as necessary.   Current Outpatient Medications   Medication Sig    Resmetirom (REZDIFFRA) 80 MG TABS Take 1 tablet by mouth daily    spironolactone (ALDACTONE) 25 MG tablet Take 1 tablet by mouth daily    timolol (BLOCADREN) 20 MG tablet TAKE 1 TABLET BY MOUTH TWICE A DAY    chlorthalidone (HYGROTON) 25 MG tablet Take 1 tablet by mouth at bedtime    clopidogrel (PLAVIX) 75 MG tablet Take 1 tablet by mouth at bedtime    nitroGLYCERIN (NITROSTAT) 0.4 MG SL tablet Place 1 tablet under the tongue every 5 minutes as needed for Chest pain    atorvastatin (LIPITOR) 10 MG tablet TAKE 1 TABLET BY MOUTH EVERY DAY FOR 30 DAYS    pantoprazole (PROTONIX) 40 MG tablet TAKE 1 TABLET BY MOUTH EVERY MORNING 30 MINUTES PRIOR TO BREAKFAST    CREON 88891-12481 units delayed release capsule TAKE 1 CAPSULE 15 MINUTES BEFORE MEALS ORALLY THREE TIMES A DAY 90 DAYS    amLODIPine (NORVASC) 10 MG tablet Take 1 tablet by mouth at bedtime TAKE 1 TABLET BY MOUTH EVERY DAY    aspirin 81 MG chewable tablet Take 1 tablet by mouth at bedtime    benazepril (LOTENSIN) 40 MG tablet Take 1 tablet by mouth at bedtime    Cholecalciferol 50 MCG (2000) TABS Take by mouth at bedtime    clotrimazole-betamethasone (LOTRISONE) 1-0.05 % cream 1 application 2 times daily    colestipol (COLESTID) 1 g tablet Take 1 tablet by mouth 2

## 2025-07-08 RX ORDER — GLIMEPIRIDE 4 MG/1
TABLET ORAL
COMMUNITY

## 2025-07-08 RX ORDER — ROSUVASTATIN CALCIUM 20 MG/1
TABLET, COATED ORAL
COMMUNITY

## 2025-07-08 RX ORDER — CLOBETASOL PROPIONATE 0.5 MG/ML
SOLUTION TOPICAL
COMMUNITY

## 2025-07-08 RX ORDER — METFORMIN HYDROCHLORIDE EXTENDED-RELEASE TABLETS 1000 MG/1
1000 TABLET, FILM COATED, EXTENDED RELEASE ORAL 2 TIMES DAILY
COMMUNITY
Start: 2025-04-28

## 2025-07-08 RX ORDER — BLOOD-GLUCOSE METER
KIT MISCELLANEOUS
COMMUNITY
Start: 2025-04-08

## 2025-07-08 RX ORDER — DICYCLOMINE HCL 20 MG
TABLET ORAL
COMMUNITY
Start: 2025-06-16

## 2025-07-08 RX ORDER — HYDROCODONE BITARTRATE AND ACETAMINOPHEN 7.5; 325 MG/1; MG/1
TABLET ORAL
COMMUNITY

## 2025-07-08 RX ORDER — CLOBETASOL PROPIONATE 0.5 MG/G
CREAM TOPICAL
COMMUNITY

## 2025-07-08 RX ORDER — GLUCAGON INJECTION, SOLUTION 1 MG/.2ML
INJECTION, SOLUTION SUBCUTANEOUS
COMMUNITY

## 2025-07-08 RX ORDER — HYDROCODONE BITARTRATE AND ACETAMINOPHEN 5; 325 MG/1; MG/1
TABLET ORAL
COMMUNITY

## 2025-07-08 RX ORDER — PRAVASTATIN SODIUM 40 MG
TABLET ORAL
COMMUNITY

## 2025-07-08 RX ORDER — BRIMONIDINE TARTRATE 2 MG/ML
SOLUTION/ DROPS OPHTHALMIC
COMMUNITY
Start: 2025-05-05

## 2025-07-08 RX ORDER — KETOCONAZOLE 20 MG/ML
SHAMPOO, SUSPENSION TOPICAL
COMMUNITY

## 2025-07-08 NOTE — PROGRESS NOTES
Name: Michel Downs  YOB: 1960  Gender: male  MRN: [id number]    CC:   Chief Complaint   Patient presents with    Follow-up     MRI results left shoulder   , bilateral shoulder pain    History of Present Illness    SubscapThe patient came in to get their shoulders checked out. They've been having trouble with their left shoulder for a long time, and their right shoulder has been bothering them for about a year. They describe the left shoulder as catching, especially when they're working on tasks like fixing their . They remember a recent incident where a belt popped off the , causing them to twist their body and their left shoulder suddenly grabbed, leading to severe pain that sometimes makes them feel sick to their stomach. Both shoulders are giving them problems, but the right one has gotten a bit better. They are right-handed and had hoped that the injection they got in their right shoulder about 1.5 weeks ago would help, but it hasn't improved their left shoulder at all. They mention that they have diabetes, but they manage it well with Dexcom to monitor their blood sugar levels.    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 Patient is right hand dominant. He reported the left shoulder pain has been a chronic issue for several years, whereas the right shoulder pain has been present for approximately for one year. He described the pain as acute, particularly when he performs overhead activities and noted persistent digital paresthesia. The pain was localized to the glenohumeral joint and radiates distally. He additionally reported hand edema upon waking if he sleeps in an improper position and severe enough to disrupt his sleep. He reported he did not experience any associated

## 2025-07-10 ENCOUNTER — OFFICE VISIT (OUTPATIENT)
Dept: ORTHOPEDIC SURGERY | Age: 65
End: 2025-07-10
Payer: COMMERCIAL

## 2025-07-10 DIAGNOSIS — M75.101 TEAR OF RIGHT ROTATOR CUFF, UNSPECIFIED TEAR EXTENT, UNSPECIFIED WHETHER TRAUMATIC: Primary | ICD-10-CM

## 2025-07-10 DIAGNOSIS — M75.101 TEAR OF RIGHT ROTATOR CUFF, UNSPECIFIED TEAR EXTENT, UNSPECIFIED WHETHER TRAUMATIC: ICD-10-CM

## 2025-07-10 DIAGNOSIS — M75.102 TEAR OF LEFT ROTATOR CUFF, UNSPECIFIED TEAR EXTENT, UNSPECIFIED WHETHER TRAUMATIC: Primary | ICD-10-CM

## 2025-07-10 DIAGNOSIS — F40.240 CLAUSTROPHOBIA: ICD-10-CM

## 2025-07-10 PROCEDURE — 99214 OFFICE O/P EST MOD 30 MIN: CPT | Performed by: ORTHOPAEDIC SURGERY

## 2025-07-10 PROCEDURE — 20610 DRAIN/INJ JOINT/BURSA W/O US: CPT | Performed by: ORTHOPAEDIC SURGERY

## 2025-07-10 RX ORDER — METHYLPREDNISOLONE ACETATE 40 MG/ML
80 INJECTION, SUSPENSION INTRA-ARTICULAR; INTRALESIONAL; INTRAMUSCULAR; SOFT TISSUE ONCE
Status: COMPLETED | OUTPATIENT
Start: 2025-07-10 | End: 2025-07-10

## 2025-07-10 RX ORDER — ALPRAZOLAM 0.5 MG
0.5 TABLET ORAL ONCE AS NEEDED
Qty: 3 TABLET | Refills: 0 | Status: SHIPPED | OUTPATIENT
Start: 2025-07-10 | End: 2025-07-11

## 2025-07-10 RX ADMIN — METHYLPREDNISOLONE ACETATE 80 MG: 40 INJECTION, SUSPENSION INTRA-ARTICULAR; INTRALESIONAL; INTRAMUSCULAR; SOFT TISSUE at 12:10

## 2025-07-10 NOTE — PATIENT INSTRUCTIONS
against the wall. Bend your elbow about 90 degrees (like the letter \"L\"), with your hand straight ahead.  Press your elbow back against the wall with about half of your strength or less. Don't let your body move forward as you press.  Hold for about 6 seconds, and then relax.  Repeat 8 to 12 times.  It's a good idea to repeat these steps with your other arm.  Wall push-up    Stand facing a wall with your feet about 12 to 24 inches from the wall. If you feel any pain when you do this exercise, stand closer to the wall.  Place your hands on the wall at shoulder height, slightly wider apart than your shoulders. Turn your fingers out a little, rather than straight up and down.  Slowly bend your elbows and bring your face toward the wall, keeping your shoulders and hips lined up. Then slowly push back to the starting position. Keep the motion smooth and controlled.  Repeat 8 to 12 times.  When you can do this exercise against a wall with ease and no pain, you can try it against a counter. You can then slowly progress to the end of a couch, then to a sturdy chair, and finally to the floor.  Resisted row    [place] an exercise band at about waist level. You can loop the band around a solid object, like a bedpost or handrail. Or you can tie a knot in the middle of the band and shut a door on the band so the knot is on the other side of the door. (Or you can have someone hold one end of the loop to provide resistance.)  Stand or sit facing where you have placed the band. Hold one end of the band in each hand.  Hold your arms out in front of you. Adjust your hold on the band so you have some tension on it.  With your shoulders relaxed, pull the bands back, and move your shoulder blades toward each other. Your elbows will pass along your waist.  Slowly return to the starting position.  Repeat 8 to 12 times.  Shoulder internal rotation (resisted)    Tie the ends of an exercise band together to form a loop. Attach one end of the

## 2025-07-21 ENCOUNTER — TELEPHONE (OUTPATIENT)
Age: 65
End: 2025-07-21

## 2025-07-21 NOTE — TELEPHONE ENCOUNTER
I spoke with patient's wife and informed her there is a process that we use.  I advised her to have the dentist office to call us and then we will go through the process.  Patient's wife verbalized understanding.

## 2025-07-21 NOTE — TELEPHONE ENCOUNTER
Patient's wife calling to see if pt can get a tooth extraction while on Plavix or if he should stop it until after surgery.

## 2025-08-28 ENCOUNTER — OFFICE VISIT (OUTPATIENT)
Dept: ORTHOPEDIC SURGERY | Age: 65
End: 2025-08-28

## 2025-08-28 DIAGNOSIS — M75.101 TEAR OF RIGHT ROTATOR CUFF, UNSPECIFIED TEAR EXTENT, UNSPECIFIED WHETHER TRAUMATIC: Primary | ICD-10-CM

## 2025-08-28 DIAGNOSIS — M75.102 TEAR OF LEFT ROTATOR CUFF, UNSPECIFIED TEAR EXTENT, UNSPECIFIED WHETHER TRAUMATIC: ICD-10-CM

## 2025-08-28 RX ORDER — METHYLPREDNISOLONE ACETATE 40 MG/ML
80 INJECTION, SUSPENSION INTRA-ARTICULAR; INTRALESIONAL; INTRAMUSCULAR; SOFT TISSUE ONCE
Status: COMPLETED | OUTPATIENT
Start: 2025-08-28 | End: 2025-08-28

## 2025-08-28 RX ADMIN — METHYLPREDNISOLONE ACETATE 80 MG: 40 INJECTION, SUSPENSION INTRA-ARTICULAR; INTRALESIONAL; INTRAMUSCULAR; SOFT TISSUE at 15:28

## (undated) DEVICE — GLIDESHEATH SLENDER STAINLESS STEEL KIT: Brand: GLIDESHEATH SLENDER

## (undated) DEVICE — DISPOSABLE PULLBACK SLED FOR MOTORDRIVE

## (undated) DEVICE — 6F .070 XB 3.5 100CM: Brand: VISTA BRITE TIP

## (undated) DEVICE — CATH ANGI BLLN DIL 3.75X15MM -- NC EUPHORA

## (undated) DEVICE — LUBE JELLY FOIL PACK 1.4 OZ: Brand: MEDLINE INDUSTRIES, INC.

## (undated) DEVICE — CATHETER DIAG 6FR L100CM GWIRE 0.038IN S STL POLYUR MP W/ 2

## (undated) DEVICE — COPILOT BLEEDBACK CONTROL VALVE: Brand: COPILOT

## (undated) DEVICE — CATH ANGI BLLN DIL 4.0X08MM -- NC EUPHORA

## (undated) DEVICE — CANNULA NSL ORAL AD FOR CAPNOFLEX CO2 O2 AIRLFE

## (undated) DEVICE — CATH BLLN DIL 2.5 X15MM RX -- EUPHORA

## (undated) DEVICE — CATH GUID .056IN 6FR 150CM -- GUIDELINER V3

## (undated) DEVICE — GUIDEWIRE 035IN 210CM PTFE COAT FIX COR J TIP 15MM FIRM BODY

## (undated) DEVICE — THE TORRENT IRRIGATION TUBING IS INTENDED TO PROVIDE IRRIGATION VIA IRRIGATION FLUIDS, SUCH AS STERILE WATER, DURING GASTROINTESTINAL ENDOSCOPIC PROCEDURES WHEN USED IN CONJUNCTION WITH AN IRRIGATION PUMP OR ELECTROSURGICAL UNIT.: Brand: TORRENT

## (undated) DEVICE — AIRLIFE™ OXYGEN TUBING 7 FEET (2.1 M) CRUSH RESISTANT OXYGEN TUBING, VINYL TIPPED: Brand: AIRLIFE™

## (undated) DEVICE — CONNECTOR TBNG OD5-7MM O2 END DISP

## (undated) DEVICE — BLOCK BITE AD 60FR W/ VELC STRP ADDRESSES MOST PT AND

## (undated) DEVICE — DEVICE COMPR LNG 27 CM VASC BND

## (undated) DEVICE — SINGLE PORT MANIFOLD: Brand: NEPTUNE 2

## (undated) DEVICE — NEEDLE SYR 18GA L1.5IN RED PLAS HUB S STL BLNT FILL W/O

## (undated) DEVICE — BAND RADIAL COMPR ARTERY 27CM -- LNG BX/10

## (undated) DEVICE — GAUZE,SPONGE,4"X4",12PLY,WOVEN,NS,LF: Brand: MEDLINE

## (undated) DEVICE — YANKAUER,BULB TIP,W/O VENT,RIGID,STERILE: Brand: MEDLINE

## (undated) DEVICE — MICROSURGICAL DILATATION DEVICE: Brand: WOLVERINE CORONARY CUTTING BALLOON

## (undated) DEVICE — CORONARY IMAGING CATHETER: Brand: OPTICROSS™ HD

## (undated) DEVICE — SYRINGE MED 10ML LUERLOCK TIP W/O SFTY DISP

## (undated) DEVICE — SYRINGE MED 3ML CLR PLAS STD N CTRL LUERLOCK TIP DISP

## (undated) DEVICE — KENDALL RADIOLUCENT FOAM MONITORING ELECTRODE RECTANGULAR SHAPE: Brand: KENDALL

## (undated) DEVICE — GEC TELE 6F TELESCOPE --

## (undated) DEVICE — RUNTHROUGH NS EXTRA FLOPPY PTCA GUIDEWIRE: Brand: RUNTHROUGH

## (undated) DEVICE — CATH BLLN ANGIO 4X8MM NC EUPHORIA RX

## (undated) DEVICE — CATHETER GUID EXTRA BACKUP 3.5 0.070IN 6FR 100CM VISTA BRITE TIP